# Patient Record
Sex: FEMALE | Race: AMERICAN INDIAN OR ALASKA NATIVE | NOT HISPANIC OR LATINO | URBAN - METROPOLITAN AREA
[De-identification: names, ages, dates, MRNs, and addresses within clinical notes are randomized per-mention and may not be internally consistent; named-entity substitution may affect disease eponyms.]

---

## 2019-11-01 ENCOUNTER — OUTPATIENT (OUTPATIENT)
Dept: OUTPATIENT SERVICES | Facility: HOSPITAL | Age: 74
LOS: 1 days | End: 2019-11-01
Payer: MEDICAID

## 2019-11-01 PROCEDURE — G9001: CPT

## 2019-11-03 ENCOUNTER — TRANSCRIPTION ENCOUNTER (OUTPATIENT)
Age: 74
End: 2019-11-03

## 2019-11-03 ENCOUNTER — INPATIENT (INPATIENT)
Facility: HOSPITAL | Age: 74
LOS: 4 days | Discharge: HOME | DRG: 871 | End: 2019-11-08
Attending: UROLOGY | Admitting: UROLOGY
Payer: MEDICAID

## 2019-11-03 ENCOUNTER — INPATIENT (INPATIENT)
Facility: HOSPITAL | Age: 74
LOS: 0 days | Discharge: ANOTHER HOSPITAL | DRG: 689 | End: 2019-11-03
Attending: INTERNAL MEDICINE | Admitting: INTERNAL MEDICINE
Payer: MEDICAID

## 2019-11-03 VITALS
DIASTOLIC BLOOD PRESSURE: 79 MMHG | TEMPERATURE: 99 F | OXYGEN SATURATION: 96 % | RESPIRATION RATE: 20 BRPM | SYSTOLIC BLOOD PRESSURE: 158 MMHG | HEART RATE: 122 BPM

## 2019-11-03 VITALS
HEART RATE: 123 BPM | DIASTOLIC BLOOD PRESSURE: 93 MMHG | TEMPERATURE: 98 F | RESPIRATION RATE: 18 BRPM | OXYGEN SATURATION: 97 % | SYSTOLIC BLOOD PRESSURE: 153 MMHG | WEIGHT: 139.99 LBS | HEIGHT: 61 IN

## 2019-11-03 VITALS — DIASTOLIC BLOOD PRESSURE: 50 MMHG | SYSTOLIC BLOOD PRESSURE: 80 MMHG | HEART RATE: 130 BPM

## 2019-11-03 DIAGNOSIS — Z29.9 ENCOUNTER FOR PROPHYLACTIC MEASURES, UNSPECIFIED: ICD-10-CM

## 2019-11-03 DIAGNOSIS — E11.00 TYPE 2 DIABETES MELLITUS WITH HYPEROSMOLARITY WITHOUT NONKETOTIC HYPERGLYCEMIC-HYPEROSMOLAR COMA (NKHHC): ICD-10-CM

## 2019-11-03 DIAGNOSIS — R00.0 TACHYCARDIA, UNSPECIFIED: ICD-10-CM

## 2019-11-03 DIAGNOSIS — N12 TUBULO-INTERSTITIAL NEPHRITIS, NOT SPECIFIED AS ACUTE OR CHRONIC: ICD-10-CM

## 2019-11-03 DIAGNOSIS — E78.5 HYPERLIPIDEMIA, UNSPECIFIED: ICD-10-CM

## 2019-11-03 DIAGNOSIS — R10.9 UNSPECIFIED ABDOMINAL PAIN: ICD-10-CM

## 2019-11-03 DIAGNOSIS — N17.9 ACUTE KIDNEY FAILURE, UNSPECIFIED: ICD-10-CM

## 2019-11-03 LAB
ACETONE SERPL-MCNC: ABNORMAL
ALBUMIN SERPL ELPH-MCNC: 3 G/DL — LOW (ref 3.3–5)
ALBUMIN SERPL ELPH-MCNC: 3.2 G/DL — LOW (ref 3.5–5)
ALBUMIN SERPL ELPH-MCNC: 3.7 G/DL — SIGNIFICANT CHANGE UP (ref 3.5–5)
ALP SERPL-CCNC: 108 U/L — SIGNIFICANT CHANGE UP (ref 40–120)
ALP SERPL-CCNC: 80 U/L — SIGNIFICANT CHANGE UP (ref 40–120)
ALP SERPL-CCNC: 84 U/L — SIGNIFICANT CHANGE UP (ref 40–120)
ALT FLD-CCNC: 15 U/L — SIGNIFICANT CHANGE UP (ref 10–45)
ALT FLD-CCNC: 21 U/L DA — SIGNIFICANT CHANGE UP (ref 10–60)
ALT FLD-CCNC: 25 U/L DA — SIGNIFICANT CHANGE UP (ref 10–60)
ANION GAP SERPL CALC-SCNC: 11 MMOL/L — SIGNIFICANT CHANGE UP (ref 5–17)
ANION GAP SERPL CALC-SCNC: 14 MMOL/L — SIGNIFICANT CHANGE UP (ref 5–17)
ANION GAP SERPL CALC-SCNC: 15 MMOL/L — SIGNIFICANT CHANGE UP (ref 5–17)
ANION GAP SERPL CALC-SCNC: 7 MMOL/L — SIGNIFICANT CHANGE UP (ref 5–17)
APPEARANCE UR: CLEAR — SIGNIFICANT CHANGE UP
APTT BLD: 25.4 SEC — LOW (ref 27.5–36.3)
APTT BLD: 25.8 SEC — LOW (ref 27.5–36.3)
AST SERPL-CCNC: 12 U/L — SIGNIFICANT CHANGE UP (ref 10–40)
AST SERPL-CCNC: 16 U/L — SIGNIFICANT CHANGE UP (ref 10–40)
AST SERPL-CCNC: 20 U/L — SIGNIFICANT CHANGE UP (ref 10–40)
B-OH-BUTYR SERPL-SCNC: 3.4 MMOL/L — HIGH
BASOPHILS # BLD AUTO: 0 K/UL — SIGNIFICANT CHANGE UP (ref 0–0.2)
BASOPHILS # BLD AUTO: 0.02 K/UL — SIGNIFICANT CHANGE UP (ref 0–0.2)
BASOPHILS # BLD AUTO: 0.03 K/UL — SIGNIFICANT CHANGE UP (ref 0–0.2)
BASOPHILS NFR BLD AUTO: 0 % — SIGNIFICANT CHANGE UP (ref 0–2)
BASOPHILS NFR BLD AUTO: 0.1 % — SIGNIFICANT CHANGE UP (ref 0–2)
BASOPHILS NFR BLD AUTO: 0.2 % — SIGNIFICANT CHANGE UP (ref 0–2)
BILIRUB SERPL-MCNC: 0.8 MG/DL — SIGNIFICANT CHANGE UP (ref 0.2–1.2)
BILIRUB SERPL-MCNC: 1 MG/DL — SIGNIFICANT CHANGE UP (ref 0.2–1.2)
BILIRUB SERPL-MCNC: 1.2 MG/DL — SIGNIFICANT CHANGE UP (ref 0.2–1.2)
BILIRUB UR-MCNC: NEGATIVE — SIGNIFICANT CHANGE UP
BLD GP AB SCN SERPL QL: NEGATIVE — SIGNIFICANT CHANGE UP
BLD GP AB SCN SERPL QL: SIGNIFICANT CHANGE UP
BUN SERPL-MCNC: 13 MG/DL — SIGNIFICANT CHANGE UP (ref 7–23)
BUN SERPL-MCNC: 14 MG/DL — SIGNIFICANT CHANGE UP (ref 7–18)
BUN SERPL-MCNC: 14 MG/DL — SIGNIFICANT CHANGE UP (ref 7–23)
BUN SERPL-MCNC: 18 MG/DL — SIGNIFICANT CHANGE UP (ref 7–18)
CALCIUM SERPL-MCNC: 7.8 MG/DL — LOW (ref 8.4–10.5)
CALCIUM SERPL-MCNC: 8.4 MG/DL — SIGNIFICANT CHANGE UP (ref 8.4–10.5)
CALCIUM SERPL-MCNC: 9 MG/DL — SIGNIFICANT CHANGE UP (ref 8.4–10.5)
CALCIUM SERPL-MCNC: 9.7 MG/DL — SIGNIFICANT CHANGE UP (ref 8.4–10.5)
CHLORIDE SERPL-SCNC: 106 MMOL/L — SIGNIFICANT CHANGE UP (ref 96–108)
CHLORIDE SERPL-SCNC: 107 MMOL/L — SIGNIFICANT CHANGE UP (ref 96–108)
CHLORIDE SERPL-SCNC: 108 MMOL/L — SIGNIFICANT CHANGE UP (ref 96–108)
CHLORIDE SERPL-SCNC: 98 MMOL/L — SIGNIFICANT CHANGE UP (ref 96–108)
CHOLEST SERPL-MCNC: 130 MG/DL — SIGNIFICANT CHANGE UP (ref 10–199)
CO2 SERPL-SCNC: 17 MMOL/L — LOW (ref 22–31)
CO2 SERPL-SCNC: 18 MMOL/L — LOW (ref 22–31)
CO2 SERPL-SCNC: 21 MMOL/L — LOW (ref 22–31)
CO2 SERPL-SCNC: 24 MMOL/L — SIGNIFICANT CHANGE UP (ref 22–31)
COLOR SPEC: YELLOW — SIGNIFICANT CHANGE UP
CREAT SERPL-MCNC: 1.02 MG/DL — SIGNIFICANT CHANGE UP (ref 0.5–1.3)
CREAT SERPL-MCNC: 1.12 MG/DL — SIGNIFICANT CHANGE UP (ref 0.5–1.3)
CREAT SERPL-MCNC: 1.32 MG/DL — HIGH (ref 0.5–1.3)
CREAT SERPL-MCNC: 1.43 MG/DL — HIGH (ref 0.5–1.3)
DIFF PNL FLD: ABNORMAL
E COLI DNA BLD POS QL NAA+NON-PROBE: SIGNIFICANT CHANGE UP
EOSINOPHIL # BLD AUTO: 0 K/UL — SIGNIFICANT CHANGE UP (ref 0–0.5)
EOSINOPHIL # BLD AUTO: 0 K/UL — SIGNIFICANT CHANGE UP (ref 0–0.5)
EOSINOPHIL # BLD AUTO: 0.05 K/UL — SIGNIFICANT CHANGE UP (ref 0–0.5)
EOSINOPHIL NFR BLD AUTO: 0 % — SIGNIFICANT CHANGE UP (ref 0–6)
EOSINOPHIL NFR BLD AUTO: 0 % — SIGNIFICANT CHANGE UP (ref 0–6)
EOSINOPHIL NFR BLD AUTO: 0.3 % — SIGNIFICANT CHANGE UP (ref 0–6)
FOLATE SERPL-MCNC: 12.7 NG/ML — SIGNIFICANT CHANGE UP
GAS PNL BLDA: SIGNIFICANT CHANGE UP
GAS PNL BLDA: SIGNIFICANT CHANGE UP
GAS PNL BLDV: SIGNIFICANT CHANGE UP
GLUCOSE BLDC GLUCOMTR-MCNC: 327 MG/DL — HIGH (ref 70–99)
GLUCOSE BLDC GLUCOMTR-MCNC: 328 MG/DL — HIGH (ref 70–99)
GLUCOSE BLDC GLUCOMTR-MCNC: 490 MG/DL — CRITICAL HIGH (ref 70–99)
GLUCOSE BLDC GLUCOMTR-MCNC: 493 MG/DL — CRITICAL HIGH (ref 70–99)
GLUCOSE BLDC GLUCOMTR-MCNC: 494 MG/DL — CRITICAL HIGH (ref 70–99)
GLUCOSE SERPL-MCNC: 341 MG/DL — HIGH (ref 70–99)
GLUCOSE SERPL-MCNC: 384 MG/DL — HIGH (ref 70–99)
GLUCOSE SERPL-MCNC: 525 MG/DL — CRITICAL HIGH (ref 70–99)
GLUCOSE SERPL-MCNC: 661 MG/DL — CRITICAL HIGH (ref 70–99)
GLUCOSE UR QL: 1000 MG/DL
GRAM STN FLD: SIGNIFICANT CHANGE UP
HAV IGM SER-ACNC: SIGNIFICANT CHANGE UP
HBA1C BLD-MCNC: 10.6 % — HIGH (ref 4–5.6)
HBV CORE IGM SER-ACNC: SIGNIFICANT CHANGE UP
HBV SURFACE AG SER-ACNC: SIGNIFICANT CHANGE UP
HCT VFR BLD CALC: 29.3 % — LOW (ref 34.5–45)
HCT VFR BLD CALC: 31.1 % — LOW (ref 34.5–45)
HCT VFR BLD CALC: 34.5 % — SIGNIFICANT CHANGE UP (ref 34.5–45)
HCT VFR BLD CALC: 37.2 % — SIGNIFICANT CHANGE UP (ref 34.5–45)
HCV AB S/CO SERPL IA: 0.15 S/CO — SIGNIFICANT CHANGE UP (ref 0–0.99)
HCV AB S/CO SERPL IA: 0.15 S/CO — SIGNIFICANT CHANGE UP (ref 0–0.99)
HCV AB SERPL-IMP: SIGNIFICANT CHANGE UP
HCV AB SERPL-IMP: SIGNIFICANT CHANGE UP
HDLC SERPL-MCNC: 45 MG/DL — LOW
HGB BLD-MCNC: 10.2 G/DL — LOW (ref 11.5–15.5)
HGB BLD-MCNC: 11.1 G/DL — LOW (ref 11.5–15.5)
HGB BLD-MCNC: 12.1 G/DL — SIGNIFICANT CHANGE UP (ref 11.5–15.5)
HGB BLD-MCNC: 9.7 G/DL — LOW (ref 11.5–15.5)
IMM GRANULOCYTES NFR BLD AUTO: 0.6 % — SIGNIFICANT CHANGE UP (ref 0–1.5)
IMM GRANULOCYTES NFR BLD AUTO: 0.8 % — SIGNIFICANT CHANGE UP (ref 0–1.5)
INR BLD: 1.15 RATIO — SIGNIFICANT CHANGE UP (ref 0.88–1.16)
INR BLD: 1.25 RATIO — HIGH (ref 0.88–1.16)
INR BLD: 1.3 RATIO — HIGH (ref 0.88–1.16)
KETONES UR-MCNC: ABNORMAL
LEUKOCYTE ESTERASE UR-ACNC: ABNORMAL
LIDOCAIN IGE QN: 220 U/L — SIGNIFICANT CHANGE UP (ref 73–393)
LIPID PNL WITH DIRECT LDL SERPL: 62 MG/DL — SIGNIFICANT CHANGE UP
LYMPHOCYTES # BLD AUTO: 0.69 K/UL — LOW (ref 1–3.3)
LYMPHOCYTES # BLD AUTO: 0.8 K/UL — LOW (ref 1–3.3)
LYMPHOCYTES # BLD AUTO: 1.93 K/UL — SIGNIFICANT CHANGE UP (ref 1–3.3)
LYMPHOCYTES # BLD AUTO: 14 % — SIGNIFICANT CHANGE UP (ref 13–44)
LYMPHOCYTES # BLD AUTO: 4.8 % — LOW (ref 13–44)
LYMPHOCYTES # BLD AUTO: 4.9 % — LOW (ref 13–44)
MAGNESIUM SERPL-MCNC: 1.4 MG/DL — LOW (ref 1.6–2.6)
MAGNESIUM SERPL-MCNC: 1.6 MG/DL — SIGNIFICANT CHANGE UP (ref 1.6–2.6)
MAGNESIUM SERPL-MCNC: 1.6 MG/DL — SIGNIFICANT CHANGE UP (ref 1.6–2.6)
MCHC RBC-ENTMCNC: 28.5 PG — SIGNIFICANT CHANGE UP (ref 27–34)
MCHC RBC-ENTMCNC: 28.8 PG — SIGNIFICANT CHANGE UP (ref 27–34)
MCHC RBC-ENTMCNC: 28.9 PG — SIGNIFICANT CHANGE UP (ref 27–34)
MCHC RBC-ENTMCNC: 29 PG — SIGNIFICANT CHANGE UP (ref 27–34)
MCHC RBC-ENTMCNC: 32.2 GM/DL — SIGNIFICANT CHANGE UP (ref 32–36)
MCHC RBC-ENTMCNC: 32.5 GM/DL — SIGNIFICANT CHANGE UP (ref 32–36)
MCHC RBC-ENTMCNC: 32.8 GM/DL — SIGNIFICANT CHANGE UP (ref 32–36)
MCHC RBC-ENTMCNC: 33.1 GM/DL — SIGNIFICANT CHANGE UP (ref 32–36)
MCV RBC AUTO: 87.5 FL — SIGNIFICANT CHANGE UP (ref 80–100)
MCV RBC AUTO: 88.1 FL — SIGNIFICANT CHANGE UP (ref 80–100)
MCV RBC AUTO: 88.5 FL — SIGNIFICANT CHANGE UP (ref 80–100)
MCV RBC AUTO: 88.6 FL — SIGNIFICANT CHANGE UP (ref 80–100)
METHOD TYPE: SIGNIFICANT CHANGE UP
MONOCYTES # BLD AUTO: 0.55 K/UL — SIGNIFICANT CHANGE UP (ref 0–0.9)
MONOCYTES # BLD AUTO: 0.65 K/UL — SIGNIFICANT CHANGE UP (ref 0–0.9)
MONOCYTES # BLD AUTO: 1.22 K/UL — HIGH (ref 0–0.9)
MONOCYTES NFR BLD AUTO: 4 % — SIGNIFICANT CHANGE UP (ref 2–14)
MONOCYTES NFR BLD AUTO: 4.6 % — SIGNIFICANT CHANGE UP (ref 2–14)
MONOCYTES NFR BLD AUTO: 7.2 % — SIGNIFICANT CHANGE UP (ref 2–14)
NEUTROPHILS # BLD AUTO: 11.32 K/UL — HIGH (ref 1.8–7.4)
NEUTROPHILS # BLD AUTO: 12.67 K/UL — HIGH (ref 1.8–7.4)
NEUTROPHILS # BLD AUTO: 14.63 K/UL — HIGH (ref 1.8–7.4)
NEUTROPHILS NFR BLD AUTO: 82 % — HIGH (ref 43–77)
NEUTROPHILS NFR BLD AUTO: 86.9 % — HIGH (ref 43–77)
NEUTROPHILS NFR BLD AUTO: 89.6 % — HIGH (ref 43–77)
NITRITE UR-MCNC: NEGATIVE — SIGNIFICANT CHANGE UP
NRBC # BLD: 0 /100 WBCS — SIGNIFICANT CHANGE UP (ref 0–0)
PH UR: 5 — SIGNIFICANT CHANGE UP (ref 5–8)
PHOSPHATE SERPL-MCNC: 1.9 MG/DL — LOW (ref 2.5–4.5)
PHOSPHATE SERPL-MCNC: 2.1 MG/DL — LOW (ref 2.5–4.5)
PHOSPHATE SERPL-MCNC: 2.5 MG/DL — SIGNIFICANT CHANGE UP (ref 2.5–4.5)
PLATELET # BLD AUTO: 165 K/UL — SIGNIFICANT CHANGE UP (ref 150–400)
PLATELET # BLD AUTO: 187 K/UL — SIGNIFICANT CHANGE UP (ref 150–400)
PLATELET # BLD AUTO: 224 K/UL — SIGNIFICANT CHANGE UP (ref 150–400)
PLATELET # BLD AUTO: 239 K/UL — SIGNIFICANT CHANGE UP (ref 150–400)
POTASSIUM SERPL-MCNC: 3.2 MMOL/L — LOW (ref 3.5–5.3)
POTASSIUM SERPL-MCNC: 3.5 MMOL/L — SIGNIFICANT CHANGE UP (ref 3.5–5.3)
POTASSIUM SERPL-MCNC: 3.8 MMOL/L — SIGNIFICANT CHANGE UP (ref 3.5–5.3)
POTASSIUM SERPL-MCNC: 4.5 MMOL/L — SIGNIFICANT CHANGE UP (ref 3.5–5.3)
POTASSIUM SERPL-SCNC: 3.2 MMOL/L — LOW (ref 3.5–5.3)
POTASSIUM SERPL-SCNC: 3.5 MMOL/L — SIGNIFICANT CHANGE UP (ref 3.5–5.3)
POTASSIUM SERPL-SCNC: 3.8 MMOL/L — SIGNIFICANT CHANGE UP (ref 3.5–5.3)
POTASSIUM SERPL-SCNC: 4.5 MMOL/L — SIGNIFICANT CHANGE UP (ref 3.5–5.3)
PROCALCITONIN SERPL-MCNC: 21.51 NG/ML — HIGH (ref 0.02–0.1)
PROT SERPL-MCNC: 5.9 G/DL — LOW (ref 6–8.3)
PROT SERPL-MCNC: 7.4 G/DL — SIGNIFICANT CHANGE UP (ref 6–8.3)
PROT SERPL-MCNC: 8.5 G/DL — HIGH (ref 6–8.3)
PROT UR-MCNC: 30 MG/DL
PROTHROM AB SERPL-ACNC: 12.8 SEC — SIGNIFICANT CHANGE UP (ref 10–12.9)
PROTHROM AB SERPL-ACNC: 14.3 SEC — HIGH (ref 10–12.9)
PROTHROM AB SERPL-ACNC: 14.9 SEC — HIGH (ref 10–12.9)
RBC # BLD: 3.35 M/UL — LOW (ref 3.8–5.2)
RBC # BLD: 3.53 M/UL — LOW (ref 3.8–5.2)
RBC # BLD: 3.9 M/UL — SIGNIFICANT CHANGE UP (ref 3.8–5.2)
RBC # BLD: 4.2 M/UL — SIGNIFICANT CHANGE UP (ref 3.8–5.2)
RBC # FLD: 12.8 % — SIGNIFICANT CHANGE UP (ref 10.3–14.5)
RBC # FLD: 13 % — SIGNIFICANT CHANGE UP (ref 10.3–14.5)
RBC # FLD: 13 % — SIGNIFICANT CHANGE UP (ref 10.3–14.5)
RBC # FLD: 13.2 % — SIGNIFICANT CHANGE UP (ref 10.3–14.5)
RH IG SCN BLD-IMP: POSITIVE — SIGNIFICANT CHANGE UP
SODIUM SERPL-SCNC: 133 MMOL/L — LOW (ref 135–145)
SODIUM SERPL-SCNC: 135 MMOL/L — SIGNIFICANT CHANGE UP (ref 135–145)
SODIUM SERPL-SCNC: 137 MMOL/L — SIGNIFICANT CHANGE UP (ref 135–145)
SODIUM SERPL-SCNC: 141 MMOL/L — SIGNIFICANT CHANGE UP (ref 135–145)
SP GR SPEC: 1.01 — SIGNIFICANT CHANGE UP (ref 1.01–1.02)
SPECIMEN SOURCE: SIGNIFICANT CHANGE UP
SPECIMEN SOURCE: SIGNIFICANT CHANGE UP
TOTAL CHOLESTEROL/HDL RATIO MEASUREMENT: 2.9 RATIO — LOW (ref 3.3–7.1)
TRIGL SERPL-MCNC: 114 MG/DL — SIGNIFICANT CHANGE UP (ref 10–149)
TSH SERPL-MCNC: 0.23 UU/ML — LOW (ref 0.34–4.82)
UROBILINOGEN FLD QL: NEGATIVE — SIGNIFICANT CHANGE UP
VIT B12 SERPL-MCNC: 642 PG/ML — SIGNIFICANT CHANGE UP (ref 232–1245)
WBC # BLD: 13.8 K/UL — HIGH (ref 3.8–10.5)
WBC # BLD: 14.15 K/UL — HIGH (ref 3.8–10.5)
WBC # BLD: 16.83 K/UL — HIGH (ref 3.8–10.5)
WBC # BLD: 24.46 K/UL — HIGH (ref 3.8–10.5)
WBC # FLD AUTO: 13.8 K/UL — HIGH (ref 3.8–10.5)
WBC # FLD AUTO: 14.15 K/UL — HIGH (ref 3.8–10.5)
WBC # FLD AUTO: 16.83 K/UL — HIGH (ref 3.8–10.5)
WBC # FLD AUTO: 24.46 K/UL — HIGH (ref 3.8–10.5)

## 2019-11-03 PROCEDURE — 99291 CRITICAL CARE FIRST HOUR: CPT

## 2019-11-03 PROCEDURE — 93005 ELECTROCARDIOGRAM TRACING: CPT

## 2019-11-03 PROCEDURE — 85610 PROTHROMBIN TIME: CPT

## 2019-11-03 PROCEDURE — 80061 LIPID PANEL: CPT

## 2019-11-03 PROCEDURE — 74176 CT ABD & PELVIS W/O CONTRAST: CPT | Mod: 26

## 2019-11-03 PROCEDURE — 83036 HEMOGLOBIN GLYCOSYLATED A1C: CPT

## 2019-11-03 PROCEDURE — 80053 COMPREHEN METABOLIC PANEL: CPT

## 2019-11-03 PROCEDURE — 85027 COMPLETE CBC AUTOMATED: CPT

## 2019-11-03 PROCEDURE — 82009 KETONE BODYS QUAL: CPT

## 2019-11-03 PROCEDURE — 99285 EMERGENCY DEPT VISIT HI MDM: CPT

## 2019-11-03 PROCEDURE — 82746 ASSAY OF FOLIC ACID SERUM: CPT

## 2019-11-03 PROCEDURE — 83735 ASSAY OF MAGNESIUM: CPT

## 2019-11-03 PROCEDURE — 83690 ASSAY OF LIPASE: CPT

## 2019-11-03 PROCEDURE — 80074 ACUTE HEPATITIS PANEL: CPT

## 2019-11-03 PROCEDURE — 99285 EMERGENCY DEPT VISIT HI MDM: CPT | Mod: 25

## 2019-11-03 PROCEDURE — 71045 X-RAY EXAM CHEST 1 VIEW: CPT | Mod: 26

## 2019-11-03 PROCEDURE — 87186 SC STD MICRODIL/AGAR DIL: CPT

## 2019-11-03 PROCEDURE — 96374 THER/PROPH/DIAG INJ IV PUSH: CPT

## 2019-11-03 PROCEDURE — 36415 COLL VENOUS BLD VENIPUNCTURE: CPT

## 2019-11-03 PROCEDURE — 99223 1ST HOSP IP/OBS HIGH 75: CPT

## 2019-11-03 PROCEDURE — 36620 INSERTION CATHETER ARTERY: CPT

## 2019-11-03 PROCEDURE — 71045 X-RAY EXAM CHEST 1 VIEW: CPT

## 2019-11-03 PROCEDURE — 87086 URINE CULTURE/COLONY COUNT: CPT

## 2019-11-03 PROCEDURE — 86901 BLOOD TYPING SEROLOGIC RH(D): CPT

## 2019-11-03 PROCEDURE — 74176 CT ABD & PELVIS W/O CONTRAST: CPT

## 2019-11-03 PROCEDURE — 82607 VITAMIN B-12: CPT

## 2019-11-03 PROCEDURE — 81001 URINALYSIS AUTO W/SCOPE: CPT

## 2019-11-03 PROCEDURE — 86900 BLOOD TYPING SEROLOGIC ABO: CPT

## 2019-11-03 PROCEDURE — 87150 DNA/RNA AMPLIFIED PROBE: CPT

## 2019-11-03 PROCEDURE — 84100 ASSAY OF PHOSPHORUS: CPT

## 2019-11-03 PROCEDURE — 82962 GLUCOSE BLOOD TEST: CPT

## 2019-11-03 PROCEDURE — 87040 BLOOD CULTURE FOR BACTERIA: CPT

## 2019-11-03 PROCEDURE — 86803 HEPATITIS C AB TEST: CPT

## 2019-11-03 PROCEDURE — 84443 ASSAY THYROID STIM HORMONE: CPT

## 2019-11-03 PROCEDURE — 86850 RBC ANTIBODY SCREEN: CPT

## 2019-11-03 PROCEDURE — 50433 PLMT NEPHROURETERAL CATHETER: CPT | Mod: RT

## 2019-11-03 PROCEDURE — 99233 SBSQ HOSP IP/OBS HIGH 50: CPT | Mod: 25

## 2019-11-03 RX ORDER — SODIUM CHLORIDE 9 MG/ML
1000 INJECTION, SOLUTION INTRAVENOUS
Refills: 0 | Status: DISCONTINUED | OUTPATIENT
Start: 2019-11-03 | End: 2019-11-03

## 2019-11-03 RX ORDER — ACETAMINOPHEN 500 MG
650 TABLET ORAL EVERY 8 HOURS
Refills: 0 | Status: DISCONTINUED | OUTPATIENT
Start: 2019-11-03 | End: 2019-11-08

## 2019-11-03 RX ORDER — ONDANSETRON 8 MG/1
0 TABLET, FILM COATED ORAL
Qty: 0 | Refills: 0 | DISCHARGE
Start: 2019-11-03

## 2019-11-03 RX ORDER — ONDANSETRON 8 MG/1
4 TABLET, FILM COATED ORAL THREE TIMES A DAY
Refills: 0 | Status: DISCONTINUED | OUTPATIENT
Start: 2019-11-03 | End: 2019-11-03

## 2019-11-03 RX ORDER — MORPHINE SULFATE 50 MG/1
2 CAPSULE, EXTENDED RELEASE ORAL ONCE
Refills: 0 | Status: DISCONTINUED | OUTPATIENT
Start: 2019-11-03 | End: 2019-11-03

## 2019-11-03 RX ORDER — ENOXAPARIN SODIUM 100 MG/ML
40 INJECTION SUBCUTANEOUS DAILY
Refills: 0 | Status: DISCONTINUED | OUTPATIENT
Start: 2019-11-03 | End: 2019-11-08

## 2019-11-03 RX ORDER — PIPERACILLIN AND TAZOBACTAM 4; .5 G/20ML; G/20ML
3.38 INJECTION, POWDER, LYOPHILIZED, FOR SOLUTION INTRAVENOUS ONCE
Refills: 0 | Status: COMPLETED | OUTPATIENT
Start: 2019-11-03 | End: 2019-11-03

## 2019-11-03 RX ORDER — SODIUM CHLORIDE 9 MG/ML
1000 INJECTION, SOLUTION INTRAVENOUS ONCE
Refills: 0 | Status: COMPLETED | OUTPATIENT
Start: 2019-11-03 | End: 2019-11-03

## 2019-11-03 RX ORDER — METFORMIN HYDROCHLORIDE 850 MG/1
1 TABLET ORAL
Qty: 0 | Refills: 0 | DISCHARGE

## 2019-11-03 RX ORDER — PIPERACILLIN AND TAZOBACTAM 4; .5 G/20ML; G/20ML
3.38 INJECTION, POWDER, LYOPHILIZED, FOR SOLUTION INTRAVENOUS EVERY 8 HOURS
Refills: 0 | Status: DISCONTINUED | OUTPATIENT
Start: 2019-11-03 | End: 2019-11-04

## 2019-11-03 RX ORDER — INSULIN HUMAN 100 [IU]/ML
8 INJECTION, SOLUTION SUBCUTANEOUS ONCE
Refills: 0 | Status: COMPLETED | OUTPATIENT
Start: 2019-11-03 | End: 2019-11-03

## 2019-11-03 RX ORDER — POLYETHYLENE GLYCOL 3350 17 G/17G
17 POWDER, FOR SOLUTION ORAL EVERY 24 HOURS
Refills: 0 | Status: DISCONTINUED | OUTPATIENT
Start: 2019-11-03 | End: 2019-11-08

## 2019-11-03 RX ORDER — CHLORHEXIDINE GLUCONATE 213 G/1000ML
1 SOLUTION TOPICAL
Refills: 0 | Status: DISCONTINUED | OUTPATIENT
Start: 2019-11-03 | End: 2019-11-08

## 2019-11-03 RX ORDER — SODIUM CHLORIDE 9 MG/ML
1000 INJECTION INTRAMUSCULAR; INTRAVENOUS; SUBCUTANEOUS ONCE
Refills: 0 | Status: COMPLETED | OUTPATIENT
Start: 2019-11-03 | End: 2019-11-03

## 2019-11-03 RX ORDER — INSULIN HUMAN 100 [IU]/ML
4 INJECTION, SOLUTION SUBCUTANEOUS ONCE
Refills: 0 | Status: COMPLETED | OUTPATIENT
Start: 2019-11-03 | End: 2019-11-03

## 2019-11-03 RX ORDER — NOREPINEPHRINE BITARTRATE/D5W 8 MG/250ML
0.05 PLASTIC BAG, INJECTION (ML) INTRAVENOUS
Qty: 8 | Refills: 0 | Status: DISCONTINUED | OUTPATIENT
Start: 2019-11-03 | End: 2019-11-03

## 2019-11-03 RX ORDER — INSULIN HUMAN 100 [IU]/ML
1 INJECTION, SOLUTION SUBCUTANEOUS
Qty: 100 | Refills: 0 | Status: DISCONTINUED | OUTPATIENT
Start: 2019-11-03 | End: 2019-11-03

## 2019-11-03 RX ORDER — MAGNESIUM SULFATE 500 MG/ML
2 VIAL (ML) INJECTION
Refills: 0 | Status: COMPLETED | OUTPATIENT
Start: 2019-11-03 | End: 2019-11-03

## 2019-11-03 RX ORDER — INSULIN HUMAN 100 [IU]/ML
4 INJECTION, SOLUTION SUBCUTANEOUS
Qty: 100 | Refills: 0 | Status: DISCONTINUED | OUTPATIENT
Start: 2019-11-03 | End: 2019-11-04

## 2019-11-03 RX ORDER — INSULIN GLARGINE 100 [IU]/ML
15 INJECTION, SOLUTION SUBCUTANEOUS AT BEDTIME
Refills: 0 | Status: DISCONTINUED | OUTPATIENT
Start: 2019-11-03 | End: 2019-11-03

## 2019-11-03 RX ORDER — ENOXAPARIN SODIUM 100 MG/ML
40 INJECTION SUBCUTANEOUS DAILY
Refills: 0 | Status: DISCONTINUED | OUTPATIENT
Start: 2019-11-03 | End: 2019-11-03

## 2019-11-03 RX ORDER — CHLORHEXIDINE GLUCONATE 213 G/1000ML
1 SOLUTION TOPICAL DAILY
Refills: 0 | Status: DISCONTINUED | OUTPATIENT
Start: 2019-11-03 | End: 2019-11-03

## 2019-11-03 RX ORDER — PHENYLEPHRINE HYDROCHLORIDE 10 MG/ML
0.5 INJECTION INTRAVENOUS
Qty: 40 | Refills: 0 | Status: DISCONTINUED | OUTPATIENT
Start: 2019-11-03 | End: 2019-11-04

## 2019-11-03 RX ORDER — ATORVASTATIN CALCIUM 80 MG/1
10 TABLET, FILM COATED ORAL AT BEDTIME
Refills: 0 | Status: DISCONTINUED | OUTPATIENT
Start: 2019-11-03 | End: 2019-11-03

## 2019-11-03 RX ORDER — POTASSIUM CHLORIDE 20 MEQ
10 PACKET (EA) ORAL
Refills: 0 | Status: COMPLETED | OUTPATIENT
Start: 2019-11-03 | End: 2019-11-03

## 2019-11-03 RX ORDER — ACETAMINOPHEN 500 MG
650 TABLET ORAL ONCE
Refills: 0 | Status: DISCONTINUED | OUTPATIENT
Start: 2019-11-03 | End: 2019-11-03

## 2019-11-03 RX ORDER — SODIUM CHLORIDE 9 MG/ML
1000 INJECTION, SOLUTION INTRAVENOUS
Refills: 0 | Status: DISCONTINUED | OUTPATIENT
Start: 2019-11-03 | End: 2019-11-04

## 2019-11-03 RX ORDER — ATORVASTATIN CALCIUM 80 MG/1
10 TABLET, FILM COATED ORAL AT BEDTIME
Refills: 0 | Status: DISCONTINUED | OUTPATIENT
Start: 2019-11-03 | End: 2019-11-04

## 2019-11-03 RX ORDER — SENNA PLUS 8.6 MG/1
2 TABLET ORAL AT BEDTIME
Refills: 0 | Status: DISCONTINUED | OUTPATIENT
Start: 2019-11-03 | End: 2019-11-08

## 2019-11-03 RX ORDER — SODIUM CHLORIDE 9 MG/ML
0 INJECTION, SOLUTION INTRAVENOUS
Qty: 0 | Refills: 0 | DISCHARGE
Start: 2019-11-03

## 2019-11-03 RX ORDER — INSULIN LISPRO 100/ML
VIAL (ML) SUBCUTANEOUS
Refills: 0 | Status: DISCONTINUED | OUTPATIENT
Start: 2019-11-03 | End: 2019-11-03

## 2019-11-03 RX ADMIN — SODIUM CHLORIDE 1000 MILLILITER(S): 9 INJECTION INTRAMUSCULAR; INTRAVENOUS; SUBCUTANEOUS at 03:07

## 2019-11-03 RX ADMIN — INSULIN HUMAN 4 UNIT(S)/HR: 100 INJECTION, SOLUTION SUBCUTANEOUS at 22:24

## 2019-11-03 RX ADMIN — Medication 100 MILLIEQUIVALENT(S): at 18:40

## 2019-11-03 RX ADMIN — Medication 6: at 11:32

## 2019-11-03 RX ADMIN — INSULIN HUMAN 4 UNIT(S): 100 INJECTION, SOLUTION SUBCUTANEOUS at 22:24

## 2019-11-03 RX ADMIN — SODIUM CHLORIDE 125 MILLILITER(S): 9 INJECTION, SOLUTION INTRAVENOUS at 18:41

## 2019-11-03 RX ADMIN — SODIUM CHLORIDE 2000 MILLILITER(S): 9 INJECTION, SOLUTION INTRAVENOUS at 14:35

## 2019-11-03 RX ADMIN — PHENYLEPHRINE HYDROCHLORIDE 12.11 MICROGRAM(S)/KG/MIN: 10 INJECTION INTRAVENOUS at 19:39

## 2019-11-03 RX ADMIN — Medication 50 GRAM(S): at 23:23

## 2019-11-03 RX ADMIN — SODIUM CHLORIDE 4000 MILLILITER(S): 9 INJECTION, SOLUTION INTRAVENOUS at 18:17

## 2019-11-03 RX ADMIN — Medication 100 MILLIEQUIVALENT(S): at 19:39

## 2019-11-03 RX ADMIN — Medication 6: at 07:52

## 2019-11-03 RX ADMIN — Medication 50 GRAM(S): at 22:36

## 2019-11-03 RX ADMIN — ATORVASTATIN CALCIUM 10 MILLIGRAM(S): 80 TABLET, FILM COATED ORAL at 21:23

## 2019-11-03 RX ADMIN — SODIUM CHLORIDE 1000 MILLILITER(S): 9 INJECTION INTRAMUSCULAR; INTRAVENOUS; SUBCUTANEOUS at 02:31

## 2019-11-03 RX ADMIN — PHENYLEPHRINE HYDROCHLORIDE 12.11 MICROGRAM(S)/KG/MIN: 10 INJECTION INTRAVENOUS at 18:40

## 2019-11-03 RX ADMIN — INSULIN HUMAN 8 UNIT(S): 100 INJECTION, SOLUTION SUBCUTANEOUS at 02:31

## 2019-11-03 RX ADMIN — PIPERACILLIN AND TAZOBACTAM 200 GRAM(S): 4; .5 INJECTION, POWDER, LYOPHILIZED, FOR SOLUTION INTRAVENOUS at 10:57

## 2019-11-03 RX ADMIN — SODIUM CHLORIDE 125 MILLILITER(S): 9 INJECTION, SOLUTION INTRAVENOUS at 19:40

## 2019-11-03 RX ADMIN — Medication 6.56 MICROGRAM(S)/KG/MIN: at 15:16

## 2019-11-03 RX ADMIN — Medication 250 MILLIMOLE(S): at 22:38

## 2019-11-03 RX ADMIN — MORPHINE SULFATE 2 MILLIGRAM(S): 50 CAPSULE, EXTENDED RELEASE ORAL at 05:00

## 2019-11-03 RX ADMIN — PIPERACILLIN AND TAZOBACTAM 200 GRAM(S): 4; .5 INJECTION, POWDER, LYOPHILIZED, FOR SOLUTION INTRAVENOUS at 18:42

## 2019-11-03 RX ADMIN — SODIUM CHLORIDE 125 MILLILITER(S): 9 INJECTION, SOLUTION INTRAVENOUS at 10:59

## 2019-11-03 RX ADMIN — Medication 100 MILLIEQUIVALENT(S): at 20:59

## 2019-11-03 RX ADMIN — SENNA PLUS 2 TABLET(S): 8.6 TABLET ORAL at 21:23

## 2019-11-03 RX ADMIN — MORPHINE SULFATE 2 MILLIGRAM(S): 50 CAPSULE, EXTENDED RELEASE ORAL at 04:31

## 2019-11-03 RX ADMIN — SODIUM CHLORIDE 70 MILLILITER(S): 9 INJECTION, SOLUTION INTRAVENOUS at 05:10

## 2019-11-03 NOTE — ED ADULT NURSE REASSESSMENT NOTE - NS ED NURSE REASSESS COMMENT FT1
Pt now c/o weakness, BP 74/53, MD Aware and ay bedside assessing pt, administering IV fluids as per MD orders. Pt AAOx3, mentating but drowsy. Daughter at bedside.

## 2019-11-03 NOTE — DISCHARGE NOTE PROVIDER - NSDCMRMEDTOKEN_GEN_ALL_CORE_FT
atorvastatin 10 mg oral tablet: 1 tab(s) orally once a day  Lactated Ringers Injection intravenous solution:  intravenous   ondansetron 2 mg/mL injectable solution:  injectable

## 2019-11-03 NOTE — H&P ADULT - ASSESSMENT
74 year old female with uncontrolled DM2, HTN, HLD presented to  ED with sepsis.  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  Hypotensive and tachycardic.  Needs decompression of R kidney to decrease risk of progression to emphysematous pyelonephritis.    Prefer IR NT to be placed into R kidney given the risks with general anesthesia with severe sepsis currently.  Discussed w/ IR fellow.    -- Admit to urology  -- IV abx  -- Continue almonte for now  -- f/u urine and blood cultures  -- f/u IR regarding placement of R NT, NT Cx  -- SICU consult  -- d/w Dr. Mcdonnell

## 2019-11-03 NOTE — PROGRESS NOTE ADULT - SUBJECTIVE AND OBJECTIVE BOX
Vascular & Interventional Radiology Post-Procedure Note    Pre-Procedure Diagnosis: Right emphysematous pyelitis  Post-Procedure Diagnosis: Same as pre.  Indications for Procedure: Sepsis    : Galen CLIFFORD, Juanjose CLIFFORD    Procedure Details/Findings: IVP (Intravenous Pyelogram) and placement of R Percutaneous Nephrostomy Tube. No hydronephrosis. Mild right hydroureter. No ureteral obstruction is noted.     Complications: None  Estimated Blood Loss: Minimal  Specimen: Sent for Microbiology  Contrast: 70ml  Sedation: MAC  Patient Condition/Disposition: Maintaining BP with 0.15mcg/kg of Levophed. SICU    Plan:     Sepsis and hyperglycemia management per SICU.

## 2019-11-03 NOTE — H&P ADULT - ASSESSMENT
74 Female with PMH of Diabetes, HLD came to hospital for uncontrolled blood sugar levels. Admitted to medicine for Hyperglycemic Hyperosmolar Nonketotic Coma.

## 2019-11-03 NOTE — ED ADULT NURSE NOTE - NSIMPLEMENTINTERV_GEN_ALL_ED
Implemented All Fall with Harm Risk Interventions:  [place] to call system. Call bell, personal items and telephone within reach. Instruct patient to call for assistance. Room bathroom lighting operational. Non-slip footwear when patient is off stretcher. Physically safe environment: no spills, clutter or unnecessary equipment. Stretcher in lowest position, wheels locked, appropriate side rails in place. Provide visual cue, wrist band, yellow gown, etc. Monitor gait and stability. Monitor for mental status changes and reorient to person, place, and time. Review medications for side effects contributing to fall risk. Reinforce activity limits and safety measures with patient and family. Provide visual clues: red socks.

## 2019-11-03 NOTE — PROGRESS NOTE ADULT - SUBJECTIVE AND OBJECTIVE BOX
Post Procedure Check    Pt seen and examined without complaints.    Vital Signs Last 24 Hrs  T(C): 37.3 (03 Nov 2019 19:00), Max: 38.2 (03 Nov 2019 13:37)  T(F): 99.1 (03 Nov 2019 19:00), Max: 100.7 (03 Nov 2019 13:37)  HR: 94 (03 Nov 2019 22:15) (93 - 130)  BP: 92/55 (03 Nov 2019 19:00) (67/54 - 158/79)  BP(mean): 68 (03 Nov 2019 19:00) (68 - 80)  RR: 16 (03 Nov 2019 22:15) (16 - 29)  SpO2: 95% (03 Nov 2019 22:15) (94% - 99%)    I&O's Summary    03 Nov 2019 07:01  -  03 Nov 2019 23:00  --------------------------------------------------------  IN: 1963.7 mL / OUT: 315 mL / NET: 1648.7 mL        Physical Exam  Gen: NAD  Abd: Soft, NT, ND  Back: R PCN in place, draining pink to clear urine  : almnote inh place, urine clear yellow                          10.2   24.46 )-----------( 239      ( 03 Nov 2019 18:42 )             31.1       11-03    135  |  107  |  13  ----------------------------<  384<H>  4.5   |  17<L>  |  1.02    Ca    7.8<L>      03 Nov 2019 21:31  Phos  1.9     11-03  Mg     1.4     11-03    TPro  5.9<L>  /  Alb  3.0<L>  /  TBili  1.0  /  DBili  x   /  AST  20  /  ALT  15  /  AlkPhos  80  11-03      A/P: 74y Female s/p R PCN placement by Interventional Radiology    -cont abx  -f/u cultures  -complete plan as per SICU

## 2019-11-03 NOTE — ED PROVIDER NOTE - ATTENDING CONTRIBUTION TO CARE
74F, pmh DM II, HLD, presents as transfer from OSH for emphysematous pyelitis. Pt being transferred to floor, had been given abx, 1L IVF, while en route developed hypotension and tachycardia. +N/V. +R flank pain, mild, rad to abd, no clear provoking or alleviating factors. Denies cp, sob, abd pain. Very hyperglycemic at OSH, given insulin.     PE: NAD, NCAT, MMM, Trachea midline, Normal conjunctiva, lungs CTAB, S1/S2 tachycardic, Normal perfusion, 2+ radial pulses bilat, Abdomen Soft, NTND, No rebound/guarding, No LE edema, No deformity of extremities, No rashes,  No focal motor or sensory deficits.     Pt hypotensive on arrival. Already received 1L IVF, continuing IVF resuscitation, if does not respond adequately will begin pressors. D/w IR, who asks that urology be consulted for evaluation and possible surgical intervention. Check labs. Cultures sent at OSH. Monitor closely. - Nikhil Olson MD

## 2019-11-03 NOTE — CHART NOTE - NSCHARTNOTEFT_GEN_A_CORE
Consult requested as per attending Dr Scott,   urology Dr Camargo and surgical team Dr Gray recommends to transfer patient for IR nephrostomy tube placement   Infectious dx Dr Cuenca consulted will f/u     Transfer center notified (spoke Nino) and transfer requested     Patient accepted for transfer by Dr Sow IR and Dr Andersen hospitalist to [facility] for further care   Patient and her daughter Rosa Maria Osman agrees to transfer     pending EMS transportation for transfer

## 2019-11-03 NOTE — H&P ADULT - HISTORY OF PRESENT ILLNESS
74 Female with PMH of Diabetes, HLD presented to  ED for sepsis 2/2 uncontrolled blood sugar levels. Pt has been taking metformin for her diabetes and last saw her PMD 3 months ago. She returned from [place] a month ago and has not been follow up with any doctor due to lack of insurance. She has been taking metformin only that she got from [place]. Today she started having nausea, vomiting and when checked her sugar it was very high. Denies fever, chest pain, shortness of breath, problems with urine or bowel. She has also felt right flank pain noted today which is mild, non-radiating, dull without aggravating factors. No trauma or heavy lifting.     Transferred to  for R NT.  Febrile, tachycardic and hypotensive here

## 2019-11-03 NOTE — H&P ADULT - ATTENDING COMMENTS
patient was seen and examined in ICU, after right NT placement. Discussed plan as above with patient and her family in detail. Diabetic control discussed. Appreciate ICU care and resuscitation. Keep Garcia and NT to drainage.

## 2019-11-03 NOTE — PROGRESS NOTE ADULT - SUBJECTIVE AND OBJECTIVE BOX
Vascular & Interventional Radiology Pre-Procedure Note    Procedure Name: Right Percutaneous Nephrostomy Tube Placement    HPI: 74y Female with emphysematous pyelonephritis presenting with sepsis.     Allergies: Unknown    Medications: See EMR      Data:  154.94  70, 63.5  T(C): 38.2  HR: 114  BP: 76/53  RR: 23  SpO2: 95%    -WBC 16.83 / HgB 11.1 / Hct 34.5 / Plt 187  -Na 137 / Cl 106 / BUN 14 / Glucose 525  -K 3.8 / CO2 24 / Cr 1.32  -ALT 21 / Alk Phos 84 / T.Bili 0.8  -INR1.15    Imaging: CT A/P 11/03/19    Plan:   -74y Female presents for right sided percutaneous nephrostomy placement.   -Risks/Benefits/alternatives explained with the patient and/or healthcare proxy and witnessed informed consent obtained.

## 2019-11-03 NOTE — ED PROVIDER NOTE - PROGRESS NOTE DETAILS
Resident: Johnnie Alvarado - Urology came to see pt, said they have discussed pt directly with IR and IR will likely be taking pt for nephrostomy. Awaiting final word. Pt still stable however map slowly trending down, will begin another 1L bolus, did POCUS that showed clear lungs. pt remained hypotensive, tachycardic s/p IVF resuscitation. given this, norepinephrine gtt started. pt to IR shortly. - Nikhil Olson MD

## 2019-11-03 NOTE — H&P ADULT - PROBLEM SELECTOR PLAN 2
Rt flank pain with tenderness  No radiation or trauma  No problems with urine or bowel.  Ordered Ct abdomen with IV contrast

## 2019-11-03 NOTE — ED PROVIDER NOTE - CLINICAL SUMMARY MEDICAL DECISION MAKING FREE TEXT BOX
74 Y F tx here for emphysematous pyelonephritis has already had workup at OSH, will continue fluid bolus, pt mentating fine with MAP >65, febrile will give Tylenol. Spoke with IR and Urology who will be down to see pt as well. do not feel that pt needs pressors at the moment will monitor closely.

## 2019-11-03 NOTE — H&P ADULT - NSHPPHYSICALEXAM_GEN_ALL_CORE
Vital Signs Last 24 Hrs  T(C): 36.9 (03 Nov 2019 01:49), Max: 36.9 (03 Nov 2019 01:49)  T(F): 98.5 (03 Nov 2019 01:49), Max: 98.5 (03 Nov 2019 01:49)  HR: 123 (03 Nov 2019 01:49) (123 - 123)  BP: 153/93 (03 Nov 2019 01:49) (153/93 - 153/93)  RR: 18 (03 Nov 2019 01:49) (18 - 18)  SpO2: 97% (03 Nov 2019 01:49) (97% - 97%)  .  GENERAL: Well developed, Elderly female, mild pain  HEENT:  Normocephalic/Atraumatic, reactive light reflex, moist mucous membranes  NECK: Supple, no JVD  RESP: Symmetric movement of the chest, clear to auscultation bilaterally  CVS: + tachy, S1 and S2 audible, no murmur, rubs or gallops noted  GI: Normal active bowel sounds present, abdomen soft, non tender, Rt flank pain   EXTREMITIES:  No edema, no clubbing, cyanosis  MSK: 5/5 strength bilateral upper and lower extremities, intact sensations to light & crude touch  PSYCH: Normal mood, normal affect observed    NEURO: Alert and oriented x 3

## 2019-11-03 NOTE — H&P ADULT - NSHPLABSRESULTS_GEN_ALL_CORE
LABS:   @ 10:42  WBC 16.83 / Hct 34.5  / SCr 1.32      @ 02:54  WBC 13.80 / Hct 37.2  / SCr 1.43         137  |  106  |  14  ----------------------------<  525<HH>  3.8   |  24  |  1.32<H>    Ca    9.0      2019 10:42  Phos  2.1       Mg     1.6         TPro  7.4  /  Alb  3.2<L>  /  TBili  0.8  /  DBili  x   /  AST  12  /  ALT  21  /  AlkPhos  84      PT/INR - ( 2019 10:24 )   PT: 12.8 sec;   INR: 1.15 ratio     RADIOLOGY:    < from: CT Abdomen and Pelvis No Cont (19 @ 08:27) >  FINDINGS:    LOWER CHEST: Mild bibasilar dependent changes. Coronary artery   calcifications. Mild cardiomegaly.    LIVER: Enlarged caudate lobe. Lobular contour. Consider cirrhosis.  BILE DUCTS: Normal caliber.  GALLBLADDER: Within normal limits.  SPLEEN: Mildlyenlarged measuring 13.9 cm sagittal. Splenic contour.  PANCREAS: Within normal limits.  ADRENALS: Within normal limits.  KIDNEYS/URETERS: There is moderate right hydroureteronephrosis down to   level urinary bladder with moderate right perinephric and periureteral   inflammatory stranding. No calculus is identified. There is air in the   right mid and upper pole calyces as well as within the right ureter.    BLADDER: Air is present in the urinary bladder.  REPRODUCTIVE ORGANS: Unremarkable uterus.    BOWEL: No bowel obstruction. Appendix is normal.  PERITONEUM: No ascites.  VESSELS: Within normal limits.  RETROPERITONEUM/LYMPH NODES: No lymphadenopathy.    ABDOMINAL WALL: 3.3 cm fat-containing umbilical hernia.  BONES: Mild thoracic degenerative changes. Grade 1 L4-5 anterolisthesis.    IMPRESSION:     Moderate right hydroureteronephrosis with perinephric and periureteral   fat stranding. Air within the right renal collecting system, right ureter   and urinary bladder without history of catheterization. 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    Surgical, urology and nephrology evaluation advised.    Enlarged hepatic caudate lobe and lobular hepatic contour suggestive of   cirrhosis.    Mild splenomegaly.    CRITICAL VALUE: I discussed the major findings in this report with TR Madden in ED Holding 11/3/2019 8:52 AM by Dr. Ramírez with readback.   Critical value policy of the hospital was followed. Read back and   confirmation of receipt of this communication was  performed. Thisverbal communication supplements the text report of this   document.    < end of copied text >         Urinalysis Basic - ( 2019 02:54 )    Color: Yellow / Appearance: Clear / S.010 / pH: x  Gluc: x / Ketone: Moderate  / Bili: Negative / Urobili: Negative   Blood: x / Protein: 30 mg/dL / Nitrite: Negative   Leuk Esterase: Small / RBC: >50 /HPF / WBC 3-5 /HPF   Sq Epi: x / Non Sq Epi: Occasional /HPF / Bacteria: Negative /HPF    Urine Cx: p  Blood Cx: p

## 2019-11-03 NOTE — H&P ADULT - NSHPPHYSICALEXAM_GEN_ALL_CORE
Physical Exam  Vital signs  T(C): 38.2 (19 @ 13:37), Max: 38.2 (19 @ 13:37)  HR: 124 (19 @ 13:50)  BP: 90/59 (19 @ 13:50)  SpO2: 98% (19 @ 13:50)  Wt(kg): --    Output    Physical Exam  Gen:  NAD    Pulm:  No respiratory distress  	  CV:  Sinus Tachycardia    GI:  Soft, NT, ND.  Lower midline scar well healed (from prior )    :  No suprapubic tenderness  No CVAT b/l  Garcia secured draining clear yellow urine

## 2019-11-03 NOTE — CONSULT NOTE ADULT - ASSESSMENT
73 y/o woman with poorly-controlled DM, HTN, HLD with findings concerning for emphysematous pyelonephritis    Pt critically ill    -NPO  -IV hydration  -IV abx, ID consult  -U Cx  -BS control  -Possible right nephrectomy 75 y/o woman with poorly-controlled DM, HTN, HLD with findings concerning for emphysematous pyelonephritis    Pt critically ill    -NPO  -IV hydration  -FC  -IV abx, ID consult  -U Cx  -BS control  -Need right nephrostomy tube by IR  -D/W Dr Camargo

## 2019-11-03 NOTE — PROCEDURE NOTE - NSPROCDETAILS_GEN_ALL_CORE
positive blood return obtained via catheter/sutured in place/all materials/supplies accounted for at end of procedure/connected to a pressurized flush line/hemostasis with direct pressure, dressing applied/Seldinger technique/ultrasound guidance/location identified, draped/prepped, sterile technique used, needle inserted/introduced

## 2019-11-03 NOTE — CHART NOTE - NSCHARTNOTEFT_GEN_A_CORE
Patient is a 74y old  Female who presents with a chief complaint of Hyperglycemia (03 Nov 2019 03:56)    Received critical read of abdominal CT from radiologist Dr Henriquez  free air found in bladder and right kidney     Per patient and daughter at bedside no manipulation/almonte and no trauma   Patient sitting comfortably, endorses some right flank area pain   on evaluation abdomen soft, non-tender and no CVAT       Vital Signs Last 24 Hrs  T(F): 98.4 (03 Nov 2019 07:32), Max: 98.5 (03 Nov 2019 01:49)  HR: 116 (03 Nov 2019 07:32) (116 - 123)  BP: 120/69 (03 Nov 2019 07:32) (120/69 - 153/93)  RR: 17 (03 Nov 2019 07:32) (17 - 18)  SpO2: 97% (03 Nov 2019 07:32) (97% - 97%)      Care Collaborated Discussed with Dr Gray   Pending full evaluation from surgical and urology team Patient is a 74y old  Female who presents with a chief complaint of Hyperglycemia (03 Nov 2019 03:56)    Received critical read of abdominal CT from radiologist Dr Henriquez:  "Moderate right hydroureteronephrosis with perinephric and periureteral fat   stranding. Air within the right renal collecting system, right ureter and   urinary bladder without history of catheterization. Concerning for   emphysematous pyelitis"     Per patient and daughter at bedside no manipulation/almonte and no trauma   Patient sitting comfortably, endorses some right flank area pain   on evaluation abdomen soft, non-tender and no CVAT       Vital Signs Last 24 Hrs  T(F): 98.4 (03 Nov 2019 07:32), Max: 98.5 (03 Nov 2019 01:49)  HR: 116 (03 Nov 2019 07:32) (116 - 123)  BP: 120/69 (03 Nov 2019 07:32) (120/69 - 153/93)  RR: 17 (03 Nov 2019 07:32) (17 - 18)  SpO2: 97% (03 Nov 2019 07:32) (97% - 97%)      Care Collaborated Discussed with attending Dr Scott and Dr Gray in house surgical and urology officer   will keep NPO till full evaluation from surgical and urology team

## 2019-11-03 NOTE — DISCHARGE NOTE PROVIDER - NSDCFUADDINST_GEN_ALL_CORE_FT
You are transferred emergently to [facility] for further treatment and management of your conditions

## 2019-11-03 NOTE — DISCHARGE NOTE PROVIDER - CARE PROVIDER_API CALL
Jaya Sow)  Interventional Radiology and Diagnostic Radiology  [address] [place] [address]  Phone: [phone]  Fax: [phone]  Follow Up Time:     Cuco Andersen)  Internal Medicine  [address] [place] [address]  Phone: [phone]  Fax: [phone]  Follow Up Time:

## 2019-11-03 NOTE — ED PROVIDER NOTE - OBJECTIVE STATEMENT
73 y/o F pt with a PMHx of DM and HLD and no significant PSHx presents to ED c/o nausea, vomiting, and hyperglycemia today. Pt is visiting here from [place] and has been here for a month and only takes Metformin for her DM, per daughter at bedside. Daughter states that today upon checking her sugar it was high. In ED, pt tachycardic to 123, w/an elevated finger stick (machine unable to give a number). Pt denies any other acute complaints. NKDA.

## 2019-11-03 NOTE — CONSULT NOTE ADULT - SUBJECTIVE AND OBJECTIVE BOX
SICU CONSULT NOTE    HPI  74 Female with PMH of Diabetes, HLD presented to  ED for sepsis 2/2 uncontrolled blood sugar levels. Pt has been taking metformin for her diabetes and last saw her PMD 3 months ago. She returned from [place] a month ago and has not been follow up with any doctor due to lack of insurance. She has been taking metformin only that she got from [place]. Today she started having nausea, vomiting and when checked her sugar it was very high. Denies fever, chest pain, shortness of breath, problems with urine or bowel. She has also felt right flank pain noted today which is mild, non-radiating, dull without aggravating factors. No trauma or heavy lifting.   At [facility] CT scan obtained showing right sided emphysematous pyelonephritis and FSG in 660s. Patient was transferred to [facility] for further management. En route patient became tachycardic and hypotensive. In ED patient was given 3L LR and started on levo at .05 for persistent hypotension. Patient taken to IR for right nephrostomy tube placement, placed under local. On arrival to SICU patient on .15 of levo. Left radial A-line placed at bedside, switched to william. Bedside ultrasound performed showing hyperdynamic LV function, appearing unfilled. 1LR bolus given.     PAST MEDICAL HISTORY: HLD (hyperlipidemia)  DM (diabetes mellitus)      PAST SURGICAL HISTORY: No significant past surgical history    SOCIAL HISTORY: denies     CODE STATUS: full code     HOME MEDICATIONS:     ALLERGIES: Allergy Status Unknown      VITAL SIGNS:  ICU Vital Signs Last 24 Hrs  T(C): 36.9 (03 Nov 2019 17:12), Max: 38.2 (03 Nov 2019 13:37)  T(F): 98.4 (03 Nov 2019 17:12), Max: 100.7 (03 Nov 2019 13:37)  HR: 112 (03 Nov 2019 18:30) (109 - 130)  BP: 104/54 (03 Nov 2019 18:15) (67/54 - 158/79)  BP(mean): 78 (03 Nov 2019 18:15) (75 - 80)  ABP: 148/138 (03 Nov 2019 18:30) (97/61 - 148/138)  ABP(mean): 144 (03 Nov 2019 18:30) (76 - 144)  RR: 27 (03 Nov 2019 18:30) (17 - 27)  SpO2: 94% (03 Nov 2019 18:30) (94% - 98%)      NEURO  Exam: AAOX3      RESPIRATORY  Mechanical Ventilation:   ABG - ( 03 Nov 2019 18:32 )  pH: 7.37  /  pCO2: 29    /  pO2: 87    / HCO3: 16    / Base Excess: -7.4  /  SaO2: 97      Lactate: x                Exam: CTABL       CARDIOVASCULAR  VBG - ( 03 Nov 2019 15:30 )  pH: 7.37  /  pCO2: 37    /  pO2: 24    / HCO3: 21    / Base Excess: -3.2  /  SaO2: 42     Lactate: 4.8              Exam:  sinus tachycardia   phenylephrine    Infusion 0.5 MICROgram(s)/kG/Min IV Continuous <Continuous>      GI/NUTRITION  Exam: soft, ND, mild TTP at right flank  Diet: NPO       GENITOURINARY/RENAL  lactated ringers. 1000 milliLiter(s) IV Continuous <Continuous>  potassium chloride  10 mEq/100 mL IVPB 10 milliEquivalent(s) IV Intermittent every 1 hour      11-03 @ 07:01  -  11-03 @ 18:44  --------------------------------------------------------  IN:  Total IN: 0 mL    OUT:    Indwelling Catheter - Urethral: 100 mL  Total OUT: 100 mL    Total NET: -100 mL        Weight (kg): 64.6 (11-03 @ 17:12), 63.5 (11-03 @ 01:49)  11-03    141  |  108  |  14  ----------------------------<  341<H>  3.2<L>   |  18<L>  |  1.12    Ca    8.4      03 Nov 2019 15:20  Phos  2.1     11-03  Mg     1.6     11-03    TPro  5.9<L>  /  Alb  3.0<L>  /  TBili  1.0  /  DBili  x   /  AST  20  /  ALT  15  /  AlkPhos  80  11-03    [x ] Garcia catheter, indication: urine output monitoring in critically ill patient    HEMATOLOGIC  [ x] VTE Prophylaxis:  enoxaparin Injectable 40 milliGRAM(s) SubCutaneous daily                          9.7    14.15 )-----------( 165      ( 03 Nov 2019 15:20 )             29.3     PT/INR - ( 03 Nov 2019 15:20 )   PT: 14.3 sec;   INR: 1.25 ratio         PTT - ( 03 Nov 2019 15:20 )  PTT:25.8 sec  Transfusion: [ ] PRBC	[ ] Platelets	[ ] FFP	[ ] Cryoprecipitate      INFECTIOUS DISEASES  piperacillin/tazobactam IVPB.. 3.375 Gram(s) IV Intermittent every 8 hours    RECENT CULTURES:      ENDOCRINE  insulin regular Infusion 1 Unit(s)/Hr IV Continuous <Continuous>    CAPILLARY BLOOD GLUCOSE      POCT Blood Glucose.: 490 mg/dL (03 Nov 2019 11:24)  POCT Blood Glucose.: 493 mg/dL (03 Nov 2019 08:29)  POCT Blood Glucose.: 494 mg/dL (03 Nov 2019 07:44)  POCT Blood Glucose.: 475 mg/dL (03 Nov 2019 03:59)  POCT Blood Glucose.: >600 mg/dL (03 Nov 2019 02:23)      PATIENT CARE ACCESS DEVICES:  [ x] Peripheral IV  [ ] Central Venous Line	[ ] R	[ ] L	[ ] IJ	[ ] Fem	[ ] SC	Placed:   [x ] Arterial Line		[ ] R	[x ] L	[ ] Fem	[ x] Rad	[ ] Ax	Placed:   [ ] PICC:					[ ] Mediport  [ ] Urinary Catheter, Date Placed:   [x] Necessity of urinary, arterial, and venous catheters discussed    OTHER MEDICATIONS: chlorhexidine 2% Cloths 1 Application(s) Topical daily      IMAGING STUDIES:  < from: CT Abdomen and Pelvis No Cont (11.03.19 @ 08:27) >  COMPARISON: None.    PROCEDURE:   CT of the Abdomen and Pelvis was performed without intravenous contrast.   Intravenous contrast: None.  Oral contrast: None.  Sagittal and coronal reformats were performed.    LIMITATIONS: Evaluation of the solid organs and GI tract is limited   without oral and IV contrast.    FINDINGS:    LOWER CHEST: Mild bibasilar dependent changes. Coronary artery   calcifications. Mild cardiomegaly.    LIVER: Enlarged caudate lobe. Lobular contour. Consider cirrhosis.  BILE DUCTS: Normal caliber.  GALLBLADDER: Within normal limits.  SPLEEN: Mildlyenlarged measuring 13.9 cm sagittal. Splenic contour.  PANCREAS: Within normal limits.  ADRENALS: Within normal limits.  KIDNEYS/URETERS: There is moderate right hydroureteronephrosis down to   level urinary bladder with moderate right perinephric and periureteral   inflammatory stranding. No calculus is identified. There is air in the   right mid and upper pole calyces as well as within the right ureter.    BLADDER: Air is present in the urinary bladder.  REPRODUCTIVE ORGANS: Unremarkable uterus.    BOWEL: No bowel obstruction. Appendix is normal.  PERITONEUM: No ascites.  VESSELS: Within normal limits.  RETROPERITONEUM/LYMPH NODES: No lymphadenopathy.    ABDOMINAL WALL: 3.3 cm fat-containing umbilical hernia.  BONES: Mild thoracic degenerative changes. Grade 1 L4-5 anterolisthesis.    IMPRESSION:     Moderate right hydroureteronephrosis with perinephric and periureteral   fat stranding. Air within the right renal collecting system, right ureter   and urinary bladder without history of catheterization. 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

## 2019-11-03 NOTE — DISCHARGE NOTE PROVIDER - HOSPITAL COURSE
Patient is a 74y old  Female who presents with a chief complaint of Hyperglycemia (03 Nov 2019 09:41)    Patient was complaining of right sided abdominal pain had abdominal CT concerning for emphysematous pyelitis.     Urology Dr Camargo, surgery Dr Gray and ID DR Cuenca was consulted and recommended to transfer for IR nephrostomy tube placement     Transfer Center  was contacted and patient was accepted for transfer to [facility] by Dr Sow and Dr Andersen for further management

## 2019-11-03 NOTE — ED PROVIDER NOTE - OBJECTIVE STATEMENT
74 Female with PMH of Diabetes, HLD came to OSH for uncontrolled blood sugar levels. 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 Urology Dr Camargo, surgery Dr Gray and ID DR Cuenca was consulted and recommended to transfer for IR nephrostomy tube placement. Transfer Center  was contacted and patient was accepted for transfer to [facility] by Dr Sow and Dr Andersen for further management 74 Female with PMH of Diabetes, HLD came to OSH for uncontrolled blood sugar levels. 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 Urology Dr Camargo, surgery Dr Gray and ID DR Cuenca was consulted and recommended to transfer for IR nephrostomy tube placement. Pt was accepted for floor to floor tx, however in route found to be hypotensive and was given a fluid bolus and re-directed to the ED. Upon arrival here pt had hypotension however MAP greater than 65 with fluids running. Spoke with Dr. Andersen who accepted pt to floor and wanted IR to be called, spoke with IR fellow and they felt that this would be difficult for them to do percutaneously due to the air and asked that urology be called again. Pt already received Zosyn and had cultures drawn at OSH. Currently pt complaining or R flank pain without any other complaints.

## 2019-11-03 NOTE — CONSULT NOTE ADULT - SUBJECTIVE AND OBJECTIVE BOX
HPI    74 Female with PMH of Diabetes, HLD presented to  ED for sepsis 2/2 uncontrolled blood sugar levels. Pt has been taking metformin for her diabetes and last saw her PMD 3 months ago. She returned from [facility] a month ago and has not been follow up with any doctor due to lack of insurance. She has been taking metformin only that she got from [facility]. Today she started having nausea, vomiting and when checked her sugar it was very high. Denies fever, chest pain, shortness of breath, problems with urine or bowel. She has also felt right flank pain noted today which is mild, non-radiating, dull without aggravating factors. No trauma or heavy lifting.     Transferred to  for R NT.  Febrile, tachycardic and hypotensive here despite bolus of fluids.     PAST MEDICAL & SURGICAL HISTORY:  HLD (hyperlipidemia)  DM (diabetes mellitus)  No significant past surgical history    MEDICATIONS  (STANDING):  lactated ringers Bolus 1000 milliLiter(s) IV Bolus once    MEDICATIONS  (PRN):    FAMILY HISTORY: N/A    Allergies  Allergy Status Unknown    Intolerances    SOCIAL HISTORY: From [facility].    REVIEW OF SYSTEMS: Otherwise negative as stated in HPI    Physical Exam  Vital signs  T(C): 38.2 (19 @ 13:37), Max: 38.2 (19 @ 13:37)  HR: 124 (19 @ 13:50)  BP: 90/59 (19 @ 13:50)  SpO2: 98% (19 @ 13:50)  Wt(kg): --    Output    Physical Exam  Gen:  NAD    Pulm:  No respiratory distress  	  CV:  Sinus Tachycardia    GI:  Soft, NT, ND.  Lower midline scar well healed (from prior )    :  No suprapubic tenderness  No CVAT b/l  Garcia secured draining clear yellow urine    LABS:   @ 10:42  WBC 16.83 / Hct 34.5  / SCr 1.32      @ 02:54  WBC 13.80 / Hct 37.2  / SCr 1.43         137  |  106  |  14  ----------------------------<  525<HH>  3.8   |  24  |  1.32<H>    Ca    9.0      2019 10:42  Phos  2.1       Mg     1.6         TPro  7.4  /  Alb  3.2<L>  /  TBili  0.8  /  DBili  x   /  AST  12  /  ALT  21  /  AlkPhos  84      PT/INR - ( 2019 10:24 )   PT: 12.8 sec;   INR: 1.15 ratio      Urinalysis Basic - ( 2019 02:54 )    Color: Yellow / Appearance: Clear / S.010 / pH: x  Gluc: x / Ketone: Moderate  / Bili: Negative / Urobili: Negative   Blood: x / Protein: 30 mg/dL / Nitrite: Negative   Leuk Esterase: Small / RBC: >50 /HPF / WBC 3-5 /HPF   Sq Epi: x / Non Sq Epi: Occasional /HPF / Bacteria: Negative /HPF    Urine Cx: p  Blood Cx: p    RADIOLOGY:    < from: CT Abdomen and Pelvis No Cont (19 @ 08:27) >  FINDINGS:    LOWER CHEST: Mild bibasilar dependent changes. Coronary artery   calcifications. Mild cardiomegaly.    LIVER: Enlarged caudate lobe. Lobular contour. Consider cirrhosis.  BILE DUCTS: Normal caliber.  GALLBLADDER: Within normal limits.  SPLEEN: Mildlyenlarged measuring 13.9 cm sagittal. Splenic contour.  PANCREAS: Within normal limits.  ADRENALS: Within normal limits.  KIDNEYS/URETERS: There is moderate right hydroureteronephrosis down to   level urinary bladder with moderate right perinephric and periureteral   inflammatory stranding. No calculus is identified. There is air in the   right mid and upper pole calyces as well as within the right ureter.    BLADDER: Air is present in the urinary bladder.  REPRODUCTIVE ORGANS: Unremarkable uterus.    BOWEL: No bowel obstruction. Appendix is normal.  PERITONEUM: No ascites.  VESSELS: Within normal limits.  RETROPERITONEUM/LYMPH NODES: No lymphadenopathy.    ABDOMINAL WALL: 3.3 cm fat-containing umbilical hernia.  BONES: Mild thoracic degenerative changes. Grade 1 L4-5 anterolisthesis.    IMPRESSION:     Moderate right hydroureteronephrosis with perinephric and periureteral   fat stranding. Air within the right renal collecting system, right ureter   and urinary bladder without history of catheterization. 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    Surgical, urology and nephrology evaluation advised.    Enlarged hepatic caudate lobe and lobular hepatic contour suggestive of   cirrhosis.    Mild splenomegaly.    CRITICAL VALUE: I discussed the major findings in this report with TR Madden in ED Holding 11/3/2019 8:52 AM by Dr. Ramírez with readback.   Critical value policy of the hospital was followed. Read back and   confirmation of receipt of this communication was  performed. Thisverbal communication supplements the text report of this   document.    < end of copied text >

## 2019-11-03 NOTE — CONSULT NOTE ADULT - ATTENDING COMMENTS
emphysematous pyelo - air in renal pelvis/ureter/bladder   advise emergent pcn placement  not available here will need to be transferred to [facility]

## 2019-11-03 NOTE — H&P ADULT - PROBLEM SELECTOR PLAN 1
Uncontrolled diabetes  Started HSS and Lantus 15  F/u HbA1c  Social work consult for insurance problems Uncontrolled diabetes  Started HSS and Lantus 15  F/u HbA1c  Social work consult for insurance problems  Consulted Dr Andersen

## 2019-11-03 NOTE — DISCHARGE NOTE NURSING/CASE MANAGEMENT/SOCIAL WORK - PATIENT PORTAL LINK FT
You can access the FollowMyHealth Patient Portal offered by [facility] by registering at the following website: [email or web address]. By joining [employer]’s FollowMyHealth portal, you will also be able to view your health information using other applications (apps) compatible with our system.

## 2019-11-03 NOTE — DISCHARGE NOTE PROVIDER - NSDCCPCAREPLAN_GEN_ALL_CORE_FT
PRINCIPAL DISCHARGE DIAGNOSIS  Diagnosis: Emphysematous pyelitis  Assessment and Plan of Treatment: You are transferred today to higher level of care hospital for further management of your condition      SECONDARY DISCHARGE DIAGNOSES  Diagnosis: Hyperosmolar non-ketotic state in patient with type 2 diabetes mellitus  Assessment and Plan of Treatment: further treatment and and management

## 2019-11-03 NOTE — ED ADULT NURSE NOTE - CHIEF COMPLAINT QUOTE
transfer from [place] c/o low blood pressure 80's systolic while en route coming to the ED, was supposed to go to floor directly.

## 2019-11-03 NOTE — ED ADULT TRIAGE NOTE - CHIEF COMPLAINT QUOTE
transfer from [place] c/o low blood pressure 80's systolic while en route coming to the ED, was supposed to go to floor directly.

## 2019-11-03 NOTE — H&P ADULT - HISTORY OF PRESENT ILLNESS
74 Female with PMH of Diabetes, HLD came to hospital for uncontrolled blood sugar levels. Pt has been taking metformin for her diabetes and last saw her PMD 3 months ago. She returned from [place] a month ago and has not been follow up with any doctor due to lack of insurance. She has been taking metformin only that got from [facility]. Today she started having nausea, vomiting and when checked her sugar it was very high. Denies fever, chest irish, shortness of breath, problems with urine or bowel. She has also noted right flank pain noted today. No trauma or heavy lifting. 74 Female with PMH of Diabetes, HLD came to hospital for uncontrolled blood sugar levels. Pt has been taking metformin for her diabetes and last saw her PMD 3 months ago. She returned from [place] a month ago and has not been follow up with any doctor due to lack of insurance. She has been taking metformin only that she got from [facility]. Today she started having nausea, vomiting and when checked her sugar it was very high. Denies fever, chest pain, shortness of breath, problems with urine or bowel. She has also felt right flank pain noted today which is mild, non-radiating, dull without aggravating factors. No trauma or heavy lifting.     EKG showed sinus tachycardia.

## 2019-11-03 NOTE — ED ADULT NURSE NOTE - OBJECTIVE STATEMENT
75 y/o F, PMH DM (noncompliant with metformin x 1.5 months), HTN, transfer from [facility] for admit to floor for pyelonephritis. Pt went to [facility] for uncontrolled hyperglycemia, was c/o R abd pain and increasing abd distention while there, CT showed possible emphysematous pyelitis, pt was accepted and to be transferred to [address] [place] [address] to receive nephrostomy tube in IR but in transport with EMS pt developed hypotension, given 1L NS, and diverted to ED as per Admit doctors because she cannot come to floor with low BP. EMS reports pt had BCs drawn and received Zosyn at [facility]. Pt is AAOx4, NAD, resp even unlabored, abd rounded/distended, skin warm/dry, oral temp 100.7F, MD aware and pt will be medicated as ordered. Pt arrived with 16F urethral almonte secured to L upper thigh, with concentrated yellow urine present. Pt only c/o R flank pain, no other complaints. Daughter at bedside, safety maintained.

## 2019-11-04 DIAGNOSIS — E11.9 TYPE 2 DIABETES MELLITUS WITHOUT COMPLICATIONS: ICD-10-CM

## 2019-11-04 DIAGNOSIS — E78.5 HYPERLIPIDEMIA, UNSPECIFIED: ICD-10-CM

## 2019-11-04 DIAGNOSIS — N12 TUBULO-INTERSTITIAL NEPHRITIS, NOT SPECIFIED AS ACUTE OR CHRONIC: ICD-10-CM

## 2019-11-04 DIAGNOSIS — I10 ESSENTIAL (PRIMARY) HYPERTENSION: ICD-10-CM

## 2019-11-04 LAB
ANION GAP SERPL CALC-SCNC: 12 MMOL/L — SIGNIFICANT CHANGE UP (ref 5–17)
ANION GAP SERPL CALC-SCNC: 8 MMOL/L — SIGNIFICANT CHANGE UP (ref 5–17)
ANION GAP SERPL CALC-SCNC: 9 MMOL/L — SIGNIFICANT CHANGE UP (ref 5–17)
APTT BLD: 25.7 SEC — LOW (ref 27.5–36.3)
BUN SERPL-MCNC: 10 MG/DL — SIGNIFICANT CHANGE UP (ref 7–23)
BUN SERPL-MCNC: 11 MG/DL — SIGNIFICANT CHANGE UP (ref 7–23)
BUN SERPL-MCNC: 9 MG/DL — SIGNIFICANT CHANGE UP (ref 7–23)
CALCIUM SERPL-MCNC: 7.6 MG/DL — LOW (ref 8.4–10.5)
CALCIUM SERPL-MCNC: 7.6 MG/DL — LOW (ref 8.4–10.5)
CALCIUM SERPL-MCNC: 7.7 MG/DL — LOW (ref 8.4–10.5)
CHLORIDE SERPL-SCNC: 106 MMOL/L — SIGNIFICANT CHANGE UP (ref 96–108)
CHLORIDE SERPL-SCNC: 107 MMOL/L — SIGNIFICANT CHANGE UP (ref 96–108)
CHLORIDE SERPL-SCNC: 107 MMOL/L — SIGNIFICANT CHANGE UP (ref 96–108)
CO2 SERPL-SCNC: 17 MMOL/L — LOW (ref 22–31)
CO2 SERPL-SCNC: 18 MMOL/L — LOW (ref 22–31)
CO2 SERPL-SCNC: 20 MMOL/L — LOW (ref 22–31)
CREAT SERPL-MCNC: 0.87 MG/DL — SIGNIFICANT CHANGE UP (ref 0.5–1.3)
CREAT SERPL-MCNC: 0.96 MG/DL — SIGNIFICANT CHANGE UP (ref 0.5–1.3)
CREAT SERPL-MCNC: 1 MG/DL — SIGNIFICANT CHANGE UP (ref 0.5–1.3)
GAS PNL BLDA: SIGNIFICANT CHANGE UP
GLUCOSE BLDC GLUCOMTR-MCNC: 149 MG/DL — HIGH (ref 70–99)
GLUCOSE BLDC GLUCOMTR-MCNC: 162 MG/DL — HIGH (ref 70–99)
GLUCOSE BLDC GLUCOMTR-MCNC: 194 MG/DL — HIGH (ref 70–99)
GLUCOSE BLDC GLUCOMTR-MCNC: 221 MG/DL — HIGH (ref 70–99)
GLUCOSE BLDC GLUCOMTR-MCNC: 231 MG/DL — HIGH (ref 70–99)
GLUCOSE BLDC GLUCOMTR-MCNC: 232 MG/DL — HIGH (ref 70–99)
GLUCOSE BLDC GLUCOMTR-MCNC: 233 MG/DL — HIGH (ref 70–99)
GLUCOSE BLDC GLUCOMTR-MCNC: 238 MG/DL — HIGH (ref 70–99)
GLUCOSE BLDC GLUCOMTR-MCNC: 260 MG/DL — HIGH (ref 70–99)
GLUCOSE BLDC GLUCOMTR-MCNC: 275 MG/DL — HIGH (ref 70–99)
GLUCOSE BLDC GLUCOMTR-MCNC: 275 MG/DL — HIGH (ref 70–99)
GLUCOSE BLDC GLUCOMTR-MCNC: 294 MG/DL — HIGH (ref 70–99)
GLUCOSE BLDC GLUCOMTR-MCNC: 298 MG/DL — HIGH (ref 70–99)
GLUCOSE BLDC GLUCOMTR-MCNC: 316 MG/DL — HIGH (ref 70–99)
GLUCOSE BLDC GLUCOMTR-MCNC: 317 MG/DL — HIGH (ref 70–99)
GLUCOSE BLDC GLUCOMTR-MCNC: 347 MG/DL — HIGH (ref 70–99)
GLUCOSE BLDC GLUCOMTR-MCNC: 350 MG/DL — HIGH (ref 70–99)
GLUCOSE SERPL-MCNC: 260 MG/DL — HIGH (ref 70–99)
GLUCOSE SERPL-MCNC: 317 MG/DL — HIGH (ref 70–99)
GLUCOSE SERPL-MCNC: 370 MG/DL — HIGH (ref 70–99)
HAV IGM SER-ACNC: SIGNIFICANT CHANGE UP
HBV CORE IGM SER-ACNC: SIGNIFICANT CHANGE UP
HBV SURFACE AG SER-ACNC: SIGNIFICANT CHANGE UP
HCT VFR BLD CALC: 26.8 % — LOW (ref 34.5–45)
HCT VFR BLD CALC: 27.9 % — LOW (ref 34.5–45)
HCV AB S/CO SERPL IA: 0.12 S/CO — SIGNIFICANT CHANGE UP (ref 0–0.99)
HCV AB SERPL-IMP: SIGNIFICANT CHANGE UP
HGB BLD-MCNC: 9.1 G/DL — LOW (ref 11.5–15.5)
HGB BLD-MCNC: 9.2 G/DL — LOW (ref 11.5–15.5)
INR BLD: 1.36 RATIO — HIGH (ref 0.88–1.16)
MAGNESIUM SERPL-MCNC: 1.9 MG/DL — SIGNIFICANT CHANGE UP (ref 1.6–2.6)
MAGNESIUM SERPL-MCNC: 2.2 MG/DL — SIGNIFICANT CHANGE UP (ref 1.6–2.6)
MAGNESIUM SERPL-MCNC: 2.7 MG/DL — HIGH (ref 1.6–2.6)
MCHC RBC-ENTMCNC: 28.9 PG — SIGNIFICANT CHANGE UP (ref 27–34)
MCHC RBC-ENTMCNC: 29.5 PG — SIGNIFICANT CHANGE UP (ref 27–34)
MCHC RBC-ENTMCNC: 33 GM/DL — SIGNIFICANT CHANGE UP (ref 32–36)
MCHC RBC-ENTMCNC: 34 GM/DL — SIGNIFICANT CHANGE UP (ref 32–36)
MCV RBC AUTO: 87 FL — SIGNIFICANT CHANGE UP (ref 80–100)
MCV RBC AUTO: 87.7 FL — SIGNIFICANT CHANGE UP (ref 80–100)
NRBC # BLD: 0 /100 WBCS — SIGNIFICANT CHANGE UP (ref 0–0)
NRBC # BLD: 0 /100 WBCS — SIGNIFICANT CHANGE UP (ref 0–0)
PHOSPHATE SERPL-MCNC: 1.8 MG/DL — LOW (ref 2.5–4.5)
PHOSPHATE SERPL-MCNC: 3.1 MG/DL — SIGNIFICANT CHANGE UP (ref 2.5–4.5)
PHOSPHATE SERPL-MCNC: 5.5 MG/DL — HIGH (ref 2.5–4.5)
PLATELET # BLD AUTO: 149 K/UL — LOW (ref 150–400)
PLATELET # BLD AUTO: 178 K/UL — SIGNIFICANT CHANGE UP (ref 150–400)
POTASSIUM SERPL-MCNC: 3.4 MMOL/L — LOW (ref 3.5–5.3)
POTASSIUM SERPL-MCNC: 3.7 MMOL/L — SIGNIFICANT CHANGE UP (ref 3.5–5.3)
POTASSIUM SERPL-MCNC: 4.2 MMOL/L — SIGNIFICANT CHANGE UP (ref 3.5–5.3)
POTASSIUM SERPL-SCNC: 3.4 MMOL/L — LOW (ref 3.5–5.3)
POTASSIUM SERPL-SCNC: 3.7 MMOL/L — SIGNIFICANT CHANGE UP (ref 3.5–5.3)
POTASSIUM SERPL-SCNC: 4.2 MMOL/L — SIGNIFICANT CHANGE UP (ref 3.5–5.3)
PROTHROM AB SERPL-ACNC: 15.7 SEC — HIGH (ref 10–12.9)
RBC # BLD: 3.08 M/UL — LOW (ref 3.8–5.2)
RBC # BLD: 3.18 M/UL — LOW (ref 3.8–5.2)
RBC # FLD: 13.1 % — SIGNIFICANT CHANGE UP (ref 10.3–14.5)
RBC # FLD: 13.3 % — SIGNIFICANT CHANGE UP (ref 10.3–14.5)
SODIUM SERPL-SCNC: 133 MMOL/L — LOW (ref 135–145)
SODIUM SERPL-SCNC: 135 MMOL/L — SIGNIFICANT CHANGE UP (ref 135–145)
SODIUM SERPL-SCNC: 136 MMOL/L — SIGNIFICANT CHANGE UP (ref 135–145)
WBC # BLD: 12.22 K/UL — HIGH (ref 3.8–10.5)
WBC # BLD: 15.04 K/UL — HIGH (ref 3.8–10.5)
WBC # FLD AUTO: 12.22 K/UL — HIGH (ref 3.8–10.5)
WBC # FLD AUTO: 15.04 K/UL — HIGH (ref 3.8–10.5)

## 2019-11-04 PROCEDURE — 99223 1ST HOSP IP/OBS HIGH 75: CPT | Mod: GC

## 2019-11-04 PROCEDURE — 99231 SBSQ HOSP IP/OBS SF/LOW 25: CPT

## 2019-11-04 PROCEDURE — 93306 TTE W/DOPPLER COMPLETE: CPT | Mod: 26

## 2019-11-04 PROCEDURE — 99291 CRITICAL CARE FIRST HOUR: CPT

## 2019-11-04 PROCEDURE — 99232 SBSQ HOSP IP/OBS MODERATE 35: CPT

## 2019-11-04 RX ORDER — SODIUM,POTASSIUM PHOSPHATES 278-250MG
1 POWDER IN PACKET (EA) ORAL
Refills: 0 | Status: DISCONTINUED | OUTPATIENT
Start: 2019-11-04 | End: 2019-11-07

## 2019-11-04 RX ORDER — MAGNESIUM SULFATE 500 MG/ML
2 VIAL (ML) INJECTION ONCE
Refills: 0 | Status: COMPLETED | OUTPATIENT
Start: 2019-11-04 | End: 2019-11-04

## 2019-11-04 RX ORDER — DEXTROSE 50 % IN WATER 50 %
25 SYRINGE (ML) INTRAVENOUS
Refills: 0 | Status: DISCONTINUED | OUTPATIENT
Start: 2019-11-04 | End: 2019-11-05

## 2019-11-04 RX ORDER — INSULIN HUMAN 100 [IU]/ML
4 INJECTION, SOLUTION SUBCUTANEOUS
Qty: 100 | Refills: 0 | Status: DISCONTINUED | OUTPATIENT
Start: 2019-11-04 | End: 2019-11-05

## 2019-11-04 RX ORDER — PIPERACILLIN AND TAZOBACTAM 4; .5 G/20ML; G/20ML
3.38 INJECTION, POWDER, LYOPHILIZED, FOR SOLUTION INTRAVENOUS EVERY 8 HOURS
Refills: 0 | Status: DISCONTINUED | OUTPATIENT
Start: 2019-11-04 | End: 2019-11-04

## 2019-11-04 RX ORDER — INSULIN LISPRO 100/ML
4 VIAL (ML) SUBCUTANEOUS
Refills: 0 | Status: DISCONTINUED | OUTPATIENT
Start: 2019-11-04 | End: 2019-11-04

## 2019-11-04 RX ORDER — INSULIN LISPRO 100/ML
VIAL (ML) SUBCUTANEOUS
Refills: 0 | Status: DISCONTINUED | OUTPATIENT
Start: 2019-11-04 | End: 2019-11-04

## 2019-11-04 RX ORDER — PIPERACILLIN AND TAZOBACTAM 4; .5 G/20ML; G/20ML
3.38 INJECTION, POWDER, LYOPHILIZED, FOR SOLUTION INTRAVENOUS EVERY 8 HOURS
Refills: 0 | Status: DISCONTINUED | OUTPATIENT
Start: 2019-11-04 | End: 2019-11-06

## 2019-11-04 RX ORDER — ATORVASTATIN CALCIUM 80 MG/1
10 TABLET, FILM COATED ORAL AT BEDTIME
Refills: 0 | Status: DISCONTINUED | OUTPATIENT
Start: 2019-11-04 | End: 2019-11-08

## 2019-11-04 RX ORDER — POTASSIUM CHLORIDE 20 MEQ
10 PACKET (EA) ORAL
Refills: 0 | Status: COMPLETED | OUTPATIENT
Start: 2019-11-04 | End: 2019-11-04

## 2019-11-04 RX ORDER — INSULIN LISPRO 100/ML
VIAL (ML) SUBCUTANEOUS AT BEDTIME
Refills: 0 | Status: DISCONTINUED | OUTPATIENT
Start: 2019-11-04 | End: 2019-11-04

## 2019-11-04 RX ORDER — INSULIN GLARGINE 100 [IU]/ML
25 INJECTION, SOLUTION SUBCUTANEOUS ONCE
Refills: 0 | Status: COMPLETED | OUTPATIENT
Start: 2019-11-04 | End: 2019-11-04

## 2019-11-04 RX ORDER — INSULIN LISPRO 100/ML
8 VIAL (ML) SUBCUTANEOUS
Refills: 0 | Status: DISCONTINUED | OUTPATIENT
Start: 2019-11-04 | End: 2019-11-04

## 2019-11-04 RX ORDER — SODIUM CHLORIDE 9 MG/ML
1000 INJECTION, SOLUTION INTRAVENOUS
Refills: 0 | Status: DISCONTINUED | OUTPATIENT
Start: 2019-11-04 | End: 2019-11-05

## 2019-11-04 RX ORDER — INSULIN HUMAN 100 [IU]/ML
4 INJECTION, SOLUTION SUBCUTANEOUS ONCE
Refills: 0 | Status: COMPLETED | OUTPATIENT
Start: 2019-11-04 | End: 2019-11-04

## 2019-11-04 RX ADMIN — Medication 4 UNIT(S): at 15:50

## 2019-11-04 RX ADMIN — Medication 250 MILLIMOLE(S): at 00:25

## 2019-11-04 RX ADMIN — Medication 100 MILLIEQUIVALENT(S): at 07:15

## 2019-11-04 RX ADMIN — Medication 63.75 MILLIMOLE(S): at 15:48

## 2019-11-04 RX ADMIN — Medication 100 MILLIEQUIVALENT(S): at 03:32

## 2019-11-04 RX ADMIN — SODIUM CHLORIDE 50 MILLILITER(S): 9 INJECTION, SOLUTION INTRAVENOUS at 23:26

## 2019-11-04 RX ADMIN — Medication 100 MILLIEQUIVALENT(S): at 02:29

## 2019-11-04 RX ADMIN — Medication 100 MILLIEQUIVALENT(S): at 06:44

## 2019-11-04 RX ADMIN — Medication 250 MILLIMOLE(S): at 01:25

## 2019-11-04 RX ADMIN — SODIUM CHLORIDE 100 MILLILITER(S): 9 INJECTION, SOLUTION INTRAVENOUS at 09:44

## 2019-11-04 RX ADMIN — SODIUM CHLORIDE 50 MILLILITER(S): 9 INJECTION, SOLUTION INTRAVENOUS at 15:17

## 2019-11-04 RX ADMIN — Medication 50 GRAM(S): at 15:13

## 2019-11-04 RX ADMIN — INSULIN GLARGINE 25 UNIT(S): 100 INJECTION, SOLUTION SUBCUTANEOUS at 11:05

## 2019-11-04 RX ADMIN — Medication 100 MILLIEQUIVALENT(S): at 09:00

## 2019-11-04 RX ADMIN — ATORVASTATIN CALCIUM 10 MILLIGRAM(S): 80 TABLET, FILM COATED ORAL at 21:07

## 2019-11-04 RX ADMIN — Medication 6: at 15:49

## 2019-11-04 RX ADMIN — SODIUM CHLORIDE 50 MILLILITER(S): 9 INJECTION, SOLUTION INTRAVENOUS at 18:22

## 2019-11-04 RX ADMIN — CHLORHEXIDINE GLUCONATE 1 APPLICATION(S): 213 SOLUTION TOPICAL at 05:48

## 2019-11-04 RX ADMIN — INSULIN HUMAN 4 UNIT(S)/HR: 100 INJECTION, SOLUTION SUBCUTANEOUS at 07:16

## 2019-11-04 RX ADMIN — INSULIN HUMAN 4 UNIT(S): 100 INJECTION, SOLUTION SUBCUTANEOUS at 22:02

## 2019-11-04 RX ADMIN — PHENYLEPHRINE HYDROCHLORIDE 12.11 MICROGRAM(S)/KG/MIN: 10 INJECTION INTRAVENOUS at 07:16

## 2019-11-04 RX ADMIN — PIPERACILLIN AND TAZOBACTAM 25 GRAM(S): 4; .5 INJECTION, POWDER, LYOPHILIZED, FOR SOLUTION INTRAVENOUS at 02:16

## 2019-11-04 RX ADMIN — PIPERACILLIN AND TAZOBACTAM 25 GRAM(S): 4; .5 INJECTION, POWDER, LYOPHILIZED, FOR SOLUTION INTRAVENOUS at 18:22

## 2019-11-04 RX ADMIN — Medication 100 MILLIEQUIVALENT(S): at 04:37

## 2019-11-04 RX ADMIN — INSULIN HUMAN 4 UNIT(S)/HR: 100 INJECTION, SOLUTION SUBCUTANEOUS at 22:03

## 2019-11-04 RX ADMIN — Medication 4: at 21:07

## 2019-11-04 RX ADMIN — ENOXAPARIN SODIUM 40 MILLIGRAM(S): 100 INJECTION SUBCUTANEOUS at 12:05

## 2019-11-04 RX ADMIN — Medication 1 TABLET(S): at 18:22

## 2019-11-04 RX ADMIN — PIPERACILLIN AND TAZOBACTAM 25 GRAM(S): 4; .5 INJECTION, POWDER, LYOPHILIZED, FOR SOLUTION INTRAVENOUS at 09:44

## 2019-11-04 RX ADMIN — SODIUM CHLORIDE 125 MILLILITER(S): 9 INJECTION, SOLUTION INTRAVENOUS at 07:15

## 2019-11-04 NOTE — CONSULT NOTE ADULT - SUBJECTIVE AND OBJECTIVE BOX
HPI:  74 Female with PMH of poorly controlled type 2 diabetes mellitus without known complications, and HLD who presented to  ED for sepsis 2/2 uncontrolled blood sugar levels. Pt has been taking metformin for her diabetes and last saw her PMD 3 months ago. She returned from [place] a month ago and has not been follow up with any doctor due to lack of insurance. She has been taking metformin only that she got from [place]. Today she started having nausea, vomiting and when checked her sugar it was very high. Denies fever, chest pain, shortness of breath, problems with urine or bowel. She has also felt right flank pain noted today which is mild, non-radiating, dull without aggravating factors. No trauma or heavy lifting.     Transferred to  for R NT.  Febrile, tachycardic and hypotensive here on arrival (03 Nov 2019 15:06)    Consult History:  Patient speaks only Silvio, minimal English; refused  service, son-in-law provided translation at bedside.    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 Patient was hypotensive and tachycardic on arrival, and required multiple boluses of LR and pressor support. She was also found to have leukocytosis, meeting sepsis criteria. IR placed a right nephrostomy tube yesterday. Patient received potassium supplementation as was initially hypokalemic and then began on insulin infusion for HHS vs DKA at 4ml/hr at 22:24 last night. Sugars trended downward overnight until reaching 149, and insulin infusion started to be tapered. Patient received 25units lantus subQ this morning, with nurse noting that insulin infusion would be stopped ~2 hours after subQ insulin given. Patient also was restarted on a regular, consistent carbohydrate diet this morning after being NPO overnight.    Patient states that she was first diagnosed with type 2 diabetes ~12 years ago, and other than hyperlipidemia has otherwise been healthy. She states that she was prescribed metformin 500mg BID by her doctor in [facility], which she has been regularly taking, and never has had any acute events related to her diabetes in the past. She claims her blood sugars are "well controlled" at home on the metformin, indicating that her regular blood sugars at home are usually in the low 200's. She denies any vision changes or blurry vision. She has no history of diabetic neuropathy, and notes that her feet only get numb or tingling sensations in the cold. She has no known history of cardiac disease. She states she and her family realized that her doctor in [facility] "wasn't taking good care" of her diabetes, so [place] the [place] at the request of her family. Today, the patient states that she feels much better than yesterday; she denies any current nausea/vomiting. She notes that her flank pain on admission is still present, but reduced and only notable at times that she's getting up out of bed/chair. She also denies any current fevers/chills, fatigue, chest pain, palpitations, shortness of breath, diarrhea, constipation, problems with urination, or issues with at the site of the nephrostomy tube placement. Her only concern currently is having better control of her diabetes.    Review of Systems:  Constitutional: No fever, fatigue  Eyes: No blurry vision  Neuro: No tremors  HEENT: No pain  Cardiovascular: No chest pain, palpitations  Respiratory: No SOB, no cough  GI: + mild right flank pain, mostly when getting up; No nausea, vomiting, abdominal pain  : No dysuria, pelvic pain  Skin: no rash  Psych: no depression  Endocrine: no polyuria, polydipsia  Hem/lymph: no swelling  Osteoporosis: no fractures    ALL OTHER SYSTEMS REVIEWED AND NEGATIVE    PAST MEDICAL & SURGICAL HISTORY:  HLD (hyperlipidemia)  DM (diabetes mellitus)    No significant past surgical history      FAMILY HISTORY:  No direct family history of diabetes.    Social History:  Lives at home with her daughter, recently moved from [facility]. She has no current insurance so has not been to a doctor in about 3 months. She denies smoking or alcohol use.    Outpatient Medications:  metformin 500mg BID  atorvastatin 10mg    MEDICATIONS  (STANDING):  atorvastatin 10 milliGRAM(s) Oral at bedtime  chlorhexidine 2% Cloths 1 Application(s) Topical <User Schedule>  enoxaparin Injectable 40 milliGRAM(s) SubCutaneous daily  insulin lispro (HumaLOG) corrective regimen sliding scale   SubCutaneous three times a day before meals  insulin lispro (HumaLOG) corrective regimen sliding scale   SubCutaneous at bedtime  lactated ringers 1000 milliLiter(s) (100 mL/Hr) IV Continuous <Continuous>  phenylephrine    Infusion 0.5 MICROgram(s)/kG/Min (12.112 mL/Hr) IV Continuous <Continuous>  piperacillin/tazobactam IVPB.. 3.375 Gram(s) IV Intermittent every 8 hours  senna 2 Tablet(s) Oral at bedtime    MEDICATIONS  (PRN):  acetaminophen   Tablet .. 650 milliGRAM(s) Oral every 8 hours PRN Mild Pain (1 - 3)  polyethylene glycol 3350 17 Gram(s) Oral every 24 hours PRN Constipation      Allergies    No known allergies    Intolerances      PHYSICAL EXAM:  VITALS: T(C): 37 (11-04-19 @ 11:00)  T(F): 98.6 (11-04-19 @ 11:00), Max: 100.7 (11-03-19 @ 13:37)  HR: 99 (11-04-19 @ 11:30) (84 - 130)  BP: 101/55 (11-04-19 @ 11:30) (67/54 - 113/59)  RR:  (15 - 32)  SpO2:  (93% - 100%)  Wt(kg): --    GENERAL: NAD, resting sitting up in chair  EYES: No proptosis, no lid lag, anicteric  HEENT:  Atraumatic, Normocephalic, moist mucous membranes  THYROID: Normal size, no palpable nodules  RESPIRATORY: Clear to auscultation bilaterally; No rales, rhonchi, wheezing  CARDIOVASCULAR: Regular rate and rhythm; No murmurs; no peripheral edema  GI: Right nephrostomy tube in place, C/D/I; Soft, nontender, non distended, normal bowel sounds  SKIN: Dry, intact, No rashes or lesions  MUSCULOSKELETAL: Full range of motion, normal strength  NEURO: gross sensation intact, extraocular movements intact, no tremor  PSYCH: Alert and oriented x 3, appropriate affect      POCT Blood Glucose.: 232 mg/dL (11-04-19 @ 11:04)  POCT Blood Glucose.: 221 mg/dL (11-04-19 @ 10:13)  POCT Blood Glucose.: 149 mg/dL (11-04-19 @ 09:13)  POCT Blood Glucose.: 162 mg/dL (11-04-19 @ 07:57)  POCT Blood Glucose.: 194 mg/dL (11-04-19 @ 06:54)  POCT Blood Glucose.: 231 mg/dL (11-04-19 @ 06:14)  POCT Blood Glucose.: 260 mg/dL (11-04-19 @ 05:08)  POCT Blood Glucose.: 275 mg/dL (11-04-19 @ 04:01)  POCT Blood Glucose.: 298 mg/dL (11-04-19 @ 03:03)  POCT Blood Glucose.: 350 mg/dL (11-04-19 @ 02:12)  POCT Blood Glucose.: 347 mg/dL (11-04-19 @ 01:05)  POCT Blood Glucose.: 316 mg/dL (11-04-19 @ 00:20)  POCT Blood Glucose.: 327 mg/dL (11-03-19 @ 23:21)  POCT Blood Glucose.: 328 mg/dL (11-03-19 @ 22:22)  POCT Blood Glucose.: 490 mg/dL (11-03-19 @ 11:24)  POCT Blood Glucose.: 493 mg/dL (11-03-19 @ 08:29)  POCT Blood Glucose.: 494 mg/dL (11-03-19 @ 07:44)  POCT Blood Glucose.: 475 mg/dL (11-03-19 @ 03:59)  POCT Blood Glucose.: >600 mg/dL (11-03-19 @ 02:23)                            9.1    12.22 )-----------( 149      ( 04 Nov 2019 05:30 )             26.8       11-04    135  |  107  |  10  ----------------------------<  260<H>  3.7   |  20<L>  |  0.96    EGFR if : 68  EGFR if non : 59<L>    Ca    7.6<L>      11-04  Mg     2.2     11-04  Phos  3.1     11-04    TPro  5.9<L>  /  Alb  3.0<L>  /  TBili  1.0  /  DBili  x   /  AST  20  /  ALT  15  /  AlkPhos  80  11-03      Thyroid Function Tests:  11-03 @ 10:42 TSH 0.23 FreeT4 -- T3 -- Anti TPO -- Anti Thyroglobulin Ab -- TSI --      Hemoglobin A1C, Whole Blood: 10.6 % <H> [4.0 - 5.6] (11-03-19 @ 14:28)      11-03 Chol 130 LDL 62 HDL 45<L> Trig 114      Radiology:   CT Abdomen and Pelvis No Cont (11.03.19 @ 08:27)  IMPRESSION:     Moderate right hydroureteronephrosis with perinephric and periureteral   fat stranding. Air within the right renal collecting system, right ureter   and urinary bladder without history of catheterization. 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    Surgical, urology and nephrology evaluation advised.    Enlarged hepatic caudate lobe and lobular hepatic contour suggestive of   cirrhosis.    Mild splenomegaly. HPI:  74 Female with PMH of poorly controlled type 2 diabetes mellitus without known complications, and HLD who presented to  ED for sepsis 2/2 uncontrolled blood sugar levels. Pt has been taking metformin for her diabetes and last saw her PMD 3 months ago. She returned from [place] a month ago and has not been follow up with any doctor due to lack of insurance. She has been taking metformin only that she got from [place]. Today she started having nausea, vomiting and when checked her sugar it was very high. Denies fever, chest pain, shortness of breath, problems with urine or bowel. She has also felt right flank pain noted today which is mild, non-radiating, dull without aggravating factors. No trauma or heavy lifting.     Transferred to  for R NT.  Febrile, tachycardic and hypotensive here on arrival (03 Nov 2019 15:06)    Consult History:  Patient speaks only Silvio, minimal English; refused  service, son-in-law provided translation at bedside.    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 Patient was hypotensive and tachycardic on arrival, and required multiple boluses of LR and pressor support. She was also found to have leukocytosis, meeting sepsis criteria. IR placed a right nephrostomy tube yesterday. Patient received potassium supplementation as was initially hypokalemic and then began on insulin infusion for HHS vs DKA at 4ml/hr at 22:24 last night. Sugars trended downward overnight until reaching 149, and insulin infusion started to be tapered. Patient received 25units lantus subQ this morning, with nurse noting that insulin infusion would be stopped ~2 hours after subQ insulin given. Patient also was restarted on a regular, consistent carbohydrate diet this morning after being NPO overnight.    Patient states that she was first diagnosed with type 2 diabetes ~12 years ago, and other than hyperlipidemia has otherwise been healthy. She states that she was prescribed metformin 500mg BID by her doctor in [facility], which she has been regularly taking, and never has had any acute events related to her diabetes in the past. She claims her blood sugars are "well controlled" at home on the metformin, indicating that her regular blood sugars at home are usually in the low 200's. She denies any vision changes or blurry vision. She has no history of diabetic neuropathy, and notes that her feet only get numb or tingling sensations in the cold. She has no known history of cardiac disease. She states she and her family realized that her doctor in [facility] "wasn't taking good care" of her diabetes, so came [place] the [place] at the request of her family. Today, the patient states that she feels much better than yesterday; she denies any current nausea/vomiting. She notes that her flank pain on admission is still present, but reduced and only notable at times that she's getting up out of bed/chair. She also denies any current fevers/chills, fatigue, chest pain, palpitations, shortness of breath, diarrhea, constipation, problems with urination, or issues with at the site of the nephrostomy tube placement. Her only concern currently is having better control of her diabetes.    Review of Systems:  Constitutional: No fever, fatigue  Eyes: No blurry vision  Neuro: No tremors  HEENT: No pain  Cardiovascular: No chest pain, palpitations  Respiratory: No SOB, no cough  GI: + mild right flank pain, mostly when getting up; No nausea, vomiting, abdominal pain  : No dysuria, pelvic pain  Skin: no rash  Psych: no depression  Endocrine: no polyuria, polydipsia  Hem/lymph: no swelling  Osteoporosis: no fractures    ALL OTHER SYSTEMS REVIEWED AND NEGATIVE    PAST MEDICAL & SURGICAL HISTORY:  HLD (hyperlipidemia)  DM (diabetes mellitus)    No significant past surgical history      FAMILY HISTORY:  Multiple family members with diabetes.    Social History:  Lives at home with her daughter, recently moved from [place]. She has no current insurance so has not been to a doctor in about 3 months. She denies smoking or alcohol use.    Outpatient Medications:  metformin 500mg BID  atorvastatin 10mg    MEDICATIONS  (STANDING):  atorvastatin 10 milliGRAM(s) Oral at bedtime  chlorhexidine 2% Cloths 1 Application(s) Topical <User Schedule>  enoxaparin Injectable 40 milliGRAM(s) SubCutaneous daily  insulin lispro (HumaLOG) corrective regimen sliding scale   SubCutaneous three times a day before meals  insulin lispro (HumaLOG) corrective regimen sliding scale   SubCutaneous at bedtime  lactated ringers 1000 milliLiter(s) (100 mL/Hr) IV Continuous <Continuous>  phenylephrine    Infusion 0.5 MICROgram(s)/kG/Min (12.112 mL/Hr) IV Continuous <Continuous>  piperacillin/tazobactam IVPB.. 3.375 Gram(s) IV Intermittent every 8 hours  senna 2 Tablet(s) Oral at bedtime    MEDICATIONS  (PRN):  acetaminophen   Tablet .. 650 milliGRAM(s) Oral every 8 hours PRN Mild Pain (1 - 3)  polyethylene glycol 3350 17 Gram(s) Oral every 24 hours PRN Constipation      Allergies    No known allergies    Intolerances      PHYSICAL EXAM:  VITALS: T(C): 37 (11-04-19 @ 11:00)  T(F): 98.6 (11-04-19 @ 11:00), Max: 100.7 (11-03-19 @ 13:37)  HR: 99 (11-04-19 @ 11:30) (84 - 130)  BP: 101/55 (11-04-19 @ 11:30) (67/54 - 113/59)  RR:  (15 - 32)  SpO2:  (93% - 100%)  Wt(kg): --    GENERAL: NAD, resting sitting up in chair  EYES: No proptosis, no lid lag, anicteric  HEENT:  Atraumatic, Normocephalic, moist mucous membranes  THYROID: Normal size, no palpable nodules  RESPIRATORY: Clear to auscultation bilaterally; No rales, rhonchi, wheezing  CARDIOVASCULAR: Regular rate and rhythm; No murmurs; no peripheral edema  GI: Right nephrostomy tube in place, C/D/I; Soft, nontender, non distended, normal bowel sounds  SKIN: Dry, intact, No rashes or lesions  MUSCULOSKELETAL: Full range of motion, normal strength  NEURO: gross sensation intact, extraocular movements intact, no tremor  PSYCH: Alert and oriented x 3, appropriate affect      POCT Blood Glucose.: 232 mg/dL (11-04-19 @ 11:04)  POCT Blood Glucose.: 221 mg/dL (11-04-19 @ 10:13)  POCT Blood Glucose.: 149 mg/dL (11-04-19 @ 09:13)  POCT Blood Glucose.: 162 mg/dL (11-04-19 @ 07:57)  POCT Blood Glucose.: 194 mg/dL (11-04-19 @ 06:54)  POCT Blood Glucose.: 231 mg/dL (11-04-19 @ 06:14)  POCT Blood Glucose.: 260 mg/dL (11-04-19 @ 05:08)  POCT Blood Glucose.: 275 mg/dL (11-04-19 @ 04:01)  POCT Blood Glucose.: 298 mg/dL (11-04-19 @ 03:03)  POCT Blood Glucose.: 350 mg/dL (11-04-19 @ 02:12)  POCT Blood Glucose.: 347 mg/dL (11-04-19 @ 01:05)  POCT Blood Glucose.: 316 mg/dL (11-04-19 @ 00:20)  POCT Blood Glucose.: 327 mg/dL (11-03-19 @ 23:21)  POCT Blood Glucose.: 328 mg/dL (11-03-19 @ 22:22)  POCT Blood Glucose.: 490 mg/dL (11-03-19 @ 11:24)  POCT Blood Glucose.: 493 mg/dL (11-03-19 @ 08:29)  POCT Blood Glucose.: 494 mg/dL (11-03-19 @ 07:44)  POCT Blood Glucose.: 475 mg/dL (11-03-19 @ 03:59)  POCT Blood Glucose.: >600 mg/dL (11-03-19 @ 02:23)                            9.1    12.22 )-----------( 149      ( 04 Nov 2019 05:30 )             26.8       11-04    135  |  107  |  10  ----------------------------<  260<H>  3.7   |  20<L>  |  0.96    EGFR if : 68  EGFR if non : 59<L>    Ca    7.6<L>      11-04  Mg     2.2     11-04  Phos  3.1     11-04    TPro  5.9<L>  /  Alb  3.0<L>  /  TBili  1.0  /  DBili  x   /  AST  20  /  ALT  15  /  AlkPhos  80  11-03      Thyroid Function Tests:  11-03 @ 10:42 TSH 0.23 FreeT4 -- T3 -- Anti TPO -- Anti Thyroglobulin Ab -- TSI --      Hemoglobin A1C, Whole Blood: 10.6 % <H> [4.0 - 5.6] (11-03-19 @ 14:28)      11-03 Chol 130 LDL 62 HDL 45<L> Trig 114      Radiology:   CT Abdomen and Pelvis No Cont (11.03.19 @ 08:27)  IMPRESSION:     Moderate right hydroureteronephrosis with perinephric and periureteral   fat stranding. Air within the right renal collecting system, right ureter   and urinary bladder without history of catheterization. 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    Surgical, urology and nephrology evaluation advised.    Enlarged hepatic caudate lobe and lobular hepatic contour suggestive of   cirrhosis.    Mild splenomegaly. HPI:  74 Female with PMH of poorly controlled type 2 diabetes mellitus without known complications, and HLD who presented to  ED for sepsis 2/2 uncontrolled blood sugar levels. Pt has been taking metformin for her diabetes and last saw her PMD 3 months ago. She returned from [place] a month ago and has not been follow up with any doctor due to lack of insurance. She has been taking metformin only that she got from [place]. Today she started having nausea, vomiting and when checked her sugar it was very high. Denies fever, chest pain, shortness of breath, problems with urine or bowel. She has also felt right flank pain noted today which is mild, non-radiating, dull without aggravating factors. No trauma or heavy lifting.     Transferred to  for R NT.  Febrile, tachycardic and hypotensive here on arrival (03 Nov 2019 15:06)    Consult History:  Patient speaks only Silvio, minimal English; refused  service, son-in-law provided translation at bedside.    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 Patient was hypotensive and tachycardic on arrival, and required multiple boluses of LR and pressor support. She was also found to have leukocytosis, meeting sepsis criteria. IR placed a right nephrostomy tube yesterday. Patient received potassium supplementation as was initially hypokalemic and then began on insulin infusion for HHS vs DKA at 4ml/hr at 22:24 last night. Sugars trended downward overnight until reaching 149, and insulin infusion started to be tapered. Patient received 25units lantus subQ this morning, with nurse noting that insulin infusion would be stopped ~2 hours after subQ insulin given. Patient also was restarted on a regular, consistent carbohydrate diet this morning after being NPO overnight.    Patient states that she was first diagnosed with type 2 diabetes ~12 years ago, and other than hyperlipidemia has otherwise been healthy. She states that she was prescribed metformin 500mg BID by her doctor in [facility], which she has been regularly taking, and never has had any acute events related to her diabetes in the past. She claims her blood sugars are "well controlled" at home on the metformin, indicating that her regular blood sugars at home are usually in the low 200's. She denies any vision changes or blurry vision. She has no history of diabetic neuropathy, and notes that her feet only get numb or tingling sensations in the cold. She has no known history of cardiac disease. She states she and her family realized that her doctor in [facility] "wasn't taking good care" of her diabetes, so came [place] the [place] at the request of her family. Today, the patient states that she feels much better than yesterday; she denies any current nausea/vomiting. She notes that her flank pain on admission is still present, but reduced and only notable at times that she's getting up out of bed/chair. She also denies any current fevers/chills, fatigue, chest pain, palpitations, shortness of breath, diarrhea, constipation, problems with urination, or issues with at the site of the nephrostomy tube placement. Her only concern currently is having better control of her diabetes.    Review of Systems:  Constitutional: No fever, fatigue  Eyes: No blurry vision  Neuro: No tremors  HEENT: No pain  Cardiovascular: No chest pain, palpitations  Respiratory: No SOB, no cough  GI: + mild right flank pain, mostly when getting up; No nausea, vomiting, abdominal pain  : No dysuria, pelvic pain  Skin: no rash  Psych: no depression  Endocrine: no polyuria, polydipsia  Hem/lymph: no swelling  Osteoporosis: no fractures    ALL OTHER SYSTEMS REVIEWED AND NEGATIVE    PAST MEDICAL & SURGICAL HISTORY:  HLD (hyperlipidemia)  DM (diabetes mellitus)    No significant past surgical history      FAMILY HISTORY:  Multiple family members with diabetes, including daughter.    Social History:  Lives at home with her daughter, recently moved from [facility]. She has no current insurance so has not been to a doctor in about 3 months. She denies smoking or alcohol use.    Outpatient Medications:  metformin 500mg BID  atorvastatin 10mg    MEDICATIONS  (STANDING):  atorvastatin 10 milliGRAM(s) Oral at bedtime  chlorhexidine 2% Cloths 1 Application(s) Topical <User Schedule>  enoxaparin Injectable 40 milliGRAM(s) SubCutaneous daily  insulin lispro (HumaLOG) corrective regimen sliding scale   SubCutaneous three times a day before meals  insulin lispro (HumaLOG) corrective regimen sliding scale   SubCutaneous at bedtime  lactated ringers 1000 milliLiter(s) (100 mL/Hr) IV Continuous <Continuous>  phenylephrine    Infusion 0.5 MICROgram(s)/kG/Min (12.112 mL/Hr) IV Continuous <Continuous>  piperacillin/tazobactam IVPB.. 3.375 Gram(s) IV Intermittent every 8 hours  senna 2 Tablet(s) Oral at bedtime    MEDICATIONS  (PRN):  acetaminophen   Tablet .. 650 milliGRAM(s) Oral every 8 hours PRN Mild Pain (1 - 3)  polyethylene glycol 3350 17 Gram(s) Oral every 24 hours PRN Constipation      Allergies    No known allergies          PHYSICAL EXAM:  VITALS: T(C): 37 (11-04-19 @ 11:00)  T(F): 98.6 (11-04-19 @ 11:00), Max: 100.7 (11-03-19 @ 13:37)  HR: 99 (11-04-19 @ 11:30) (84 - 130)  BP: 101/55 (11-04-19 @ 11:30) (67/54 - 113/59)  RR:  (15 - 32)  SpO2:  (93% - 100%)  Wt(kg): --    GENERAL: NAD, resting sitting up in chair  EYES: No proptosis, no lid lag, anicteric  HEENT:  Atraumatic, Normocephalic, moist mucous membranes  THYROID: Normal size, no palpable nodules  RESPIRATORY: Clear to auscultation bilaterally; No rales, rhonchi, wheezing  CARDIOVASCULAR: Regular rate and rhythm; No murmurs; no peripheral edema  GI: Right nephrostomy tube in place, C/D/I; Soft, nontender, non distended, normal bowel sounds  SKIN: Dry, intact, No rashes or lesions on feet b/l  MUSCULOSKELETAL: Full range of motion, normal strength  NEURO: gross sensation intact, extraocular movements intact, +head bobble  PSYCH: Alert and oriented x 3, appropriate affect      POCT Blood Glucose.: 232 mg/dL (11-04-19 @ 11:04)  POCT Blood Glucose.: 221 mg/dL (11-04-19 @ 10:13)  POCT Blood Glucose.: 149 mg/dL (11-04-19 @ 09:13)  POCT Blood Glucose.: 162 mg/dL (11-04-19 @ 07:57)  POCT Blood Glucose.: 194 mg/dL (11-04-19 @ 06:54)  POCT Blood Glucose.: 231 mg/dL (11-04-19 @ 06:14)  POCT Blood Glucose.: 260 mg/dL (11-04-19 @ 05:08)  POCT Blood Glucose.: 275 mg/dL (11-04-19 @ 04:01)  POCT Blood Glucose.: 298 mg/dL (11-04-19 @ 03:03)  POCT Blood Glucose.: 350 mg/dL (11-04-19 @ 02:12)  POCT Blood Glucose.: 347 mg/dL (11-04-19 @ 01:05)  POCT Blood Glucose.: 316 mg/dL (11-04-19 @ 00:20)  POCT Blood Glucose.: 327 mg/dL (11-03-19 @ 23:21)  POCT Blood Glucose.: 328 mg/dL (11-03-19 @ 22:22)  POCT Blood Glucose.: 490 mg/dL (11-03-19 @ 11:24)  POCT Blood Glucose.: 493 mg/dL (11-03-19 @ 08:29)  POCT Blood Glucose.: 494 mg/dL (11-03-19 @ 07:44)  POCT Blood Glucose.: 475 mg/dL (11-03-19 @ 03:59)  POCT Blood Glucose.: >600 mg/dL (11-03-19 @ 02:23)                            9.1    12.22 )-----------( 149      ( 04 Nov 2019 05:30 )             26.8       11-04    135  |  107  |  10  ----------------------------<  260<H>  3.7   |  20<L>  |  0.96    EGFR if : 68  EGFR if non : 59<L>    Ca    7.6<L>      11-04  Mg     2.2     11-04  Phos  3.1     11-04    TPro  5.9<L>  /  Alb  3.0<L>  /  TBili  1.0  /  DBili  x   /  AST  20  /  ALT  15  /  AlkPhos  80  11-03      Thyroid Function Tests:  11-03 @ 10:42 TSH 0.23 FreeT4 -- T3 -- Anti TPO -- Anti Thyroglobulin Ab -- TSI --      Hemoglobin A1C, Whole Blood: 10.6 % <H> [4.0 - 5.6] (11-03-19 @ 14:28)      11-03 Chol 130 LDL 62 HDL 45<L> Trig 114      Radiology:   CT Abdomen and Pelvis No Cont (11.03.19 @ 08:27)  IMPRESSION:     Moderate right hydroureteronephrosis with perinephric and periureteral   fat stranding. Air within the right renal collecting system, right ureter   and urinary bladder without history of catheterization. 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    Surgical, urology and nephrology evaluation advised.    Enlarged hepatic caudate lobe and lobular hepatic contour suggestive of   cirrhosis.    Mild splenomegaly.

## 2019-11-04 NOTE — PROGRESS NOTE ADULT - SUBJECTIVE AND OBJECTIVE BOX
HISTORY:  74 year old female with a PMHx not limited to DM type II and HLD (rest unknown) who presented initially to [facility] on 11/3 with glucose in the 600s, nausea/vomiting, and right flank pain. She was only taking metformin that was prescribed in [place] and had not been following up with doctors in the [place] due to lack of insurance. Labs were significant for a leukocytosis. She was cultured, started on antibiotics, given 3 L of crystalloid, and admitted to medicine for glycemic control. CT abdomen/pelvis was obtained for her right flank pain, which revealed right emphysematous pyelonephritis. Patient was subsequently transferred to [facility] for emergent right nephrostomy tube placement with IR. She became hypotensive and was given an additional liter of crystalloid before being started on vasopressor support. Patient was admitted to SICU for hemodynamic monitoring.    24 HOUR EVENTS:  - POCUS demonstrated hypovolemia so given 1 L of LR but remains on vasopressor support with phenylephrine  - Started on insulin infusion for HHS vs. DKA  - Hepatitis panel sent due to evidence of cirrhosis on CT scan  - Repeat blood cultures sent  - Right nephrostomy tube with minimal output, urology aware HISTORY:  74 year old female with a PMHx not limited to DM type II and HLD (rest unknown) who presented initially to [facility] on 11/3 with glucose in the 600s, nausea/vomiting, and right flank pain. She was only taking metformin that was prescribed in [place] and has not been following up with doctors in the [place] due to lack of insurance. Labs were significant for a leukocytosis. She was cultured, started on antibiotics, given 3 L of crystalloid, and admitted to medicine for glycemic control. CT abdomen/pelvis was obtained for her right flank pain, which revealed right emphysematous pyelonephritis. Patient was subsequently transferred to [facility] for emergent right nephrostomy tube placement with IR. She became hypotensive and was given an additional liter of crystalloid before being started on vasopressor support. Patient was admitted to SICU for hemodynamic monitoring.    24 HOUR EVENTS:  - POCUS demonstrated hypovolemia so given 1 L of LR but remains on vasopressor support with phenylephrine  - Started on insulin infusion for HHS vs. DKA  - Hepatitis panel sent due to evidence of cirrhosis on CT scan  - Repeat blood cultures sent as blood cultures from 11/3 were positive for E. coli    SUBJECTIVE/ROS:  [x] A ten-point review of systems was otherwise negative except as noted.  [ ] Due to altered mental status/intubation, subjective information were not able to be obtained from the patient. History was obtained, to the extent possible, from review of the chart and collateral sources of information.    NEURO  Exam: awake, alert, oriented x4, no acute distress, no focal deficits  Meds: acetaminophen   Tablet .. 650 milliGRAM(s) Oral every 8 hours PRN Mild Pain (1 - 3)  [x] Adequacy of sedation and pain control has been assessed and adjusted    RESPIRATORY  RR: 18 (11-04-19 @ 04:15) (15 - 29)  SpO2: 96% (11-04-19 @ 04:15) (93% - 99%)  Exam: clear to auscultation bilaterally  Mechanical Ventilation: no  [N/A] Extubation Readiness Assessed  Meds: none    CARDIOVASCULAR  HR: 88 (11-04-19 @ 04:15) (84 - 130)  BP: 91/56 (11-04-19 @ 02:15) (67/54 - 158/79)  BP(mean): 68 (11-04-19 @ 02:15) (68 - 80)  ABP: 99/56 (11-04-19 @ 04:15) (87/51 - 148/138)  ABP(mean): 73 (11-04-19 @ 04:15) (66 - 144)  ABG - ( 04 Nov 2019 01:12 )  pH: 7.36  /  pCO2: 38    /  pO2: 62    / HCO3: 20    / Base Excess: -4.1  /  SaO2: 91    /    Lactate: 1.2  Exam: regular rate and rhythm, S1S2  Cardiac Rhythm: sinus  Perfusion    [x]Adequate    [ ]Inadequate  Mentation   [x]Normal       [ ]Reduced  Extremities  [X]Warm         [ ]Cool  Volume Status [ ]Hypervolemic [x]Euvolemic [ ]Hypovolemic  Meds: phenylephrine    Infusion 0.5 MICROgram(s)/kG/Min IV Continuous <Continuous>    GI/NUTRITION  Exam: soft, nontender, nondistended, right nephrostomy tube with pink lemonade-colored urine  Diet: NPO except medications  Meds:  - polyethylene glycol 3350 17 Gram(s) Oral every 24 hours PRN Constipation  - senna 2 Tablet(s) Oral at bedtime    GENITOURINARY  I&O's Detail    11-03 @ 07:01  -  11-04 @ 04:22  --------------------------------------------------------  IN:    insulin regular Infusion: 35 mL    IV PiggyBack: 1250 mL    Lactated Ringers IV Bolus: 1000 mL    lactated ringers.: 1225 mL    norepinephrine Infusion: 15 mL    phenylephrine   Infusion: 98.1 mL    Solution: 275 mL  Total IN: 3898.1 mL    OUT:    Indwelling Catheter - Urethral: 940 mL    T-Tube: 100 mL  Total OUT: 1040 mL    Total NET: 2858.1 mL    136  |  106  |  11  ----------------------------<  370<H>  3.4<L>   |  18<L>  |  1.00    Ca    7.7<L>      04 Nov 2019 01:16  Phos  5.5  Mg     2.7    [x] Garcia catheter, indication: urine output monitoring in the critically ill  Meds: lactated ringers infuse at 125 mL/hr    HEMATOLOGIC  Meds: enoxaparin Injectable 40 milliGRAM(s) SubCutaneous daily  [x] VTE Prophylaxis                        9.2    15.04 )-----------( 178      ( 04 Nov 2019 01:16 )             27.9     PT/INR - ( 04 Nov 2019 01:16 )   PT: 15.7 sec;   INR: 1.36 ratio    PTT - ( 04 Nov 2019 01:16 )  PTT:25.7 sec    INFECTIOUS DISEASES  T(C): 37.1 (11-04-19 @ 03:00), Max: 38.2 (11-03-19 @ 13:37)  WBC Count:  - 15.04 K/uL (11-04 @ 01:16)  - 24.46 K/uL (11-03 @ 18:42)  - 14.15 K/uL (11-03 @ 15:20)  - 16.83 K/uL (11-03 @ 10:42)  Recent Cultures:  - Specimen Source: .Blood, 11-03 @ 15:07  Results: Growth in anaerobic & aerobic bottle: Gram Negative Rods  Gram Stain: Growth in anaerobic & aerobic bottle: Gram Negative Rods  Organism: Blood Culture PCR  Meds: piperacillin/tazobactam IVPB.. 3.375 Gram(s) IV Intermittent every 8 hours    ENDOCRINE  Capillary Blood Glucose:  - 275 mg/dL (04 Nov 2019 04:01)  - 298 mg/dL (04 Nov 2019 03:03)  - 350 mg/dL (04 Nov 2019 02:12)  - 347 mg/dL (04 Nov 2019 01:05)  - 316 mg/dL (04 Nov 2019 00:20)  - 327 mg/dL (03 Nov 2019 23:21)  - 328 mg/dL (03 Nov 2019 22:22)  - 490 mg/dL (03 Nov 2019 11:24)  - 493 mg/dL (03 Nov 2019 08:29)  - 494 mg/dL (03 Nov 2019 07:44)  Meds: insulin regular Infusion 4 Unit(s)/Hr IV Continuous <Continuous>    ACCESS DEVICES:  [x] Peripheral IV  [ ] Central Venous Line	[ ] R	[ ] L	[ ] IJ	[ ] Fem	[ ] SC	Placed:   [x] Arterial Line		[ ] R	[x] L	[ ] Fem	[x] Rad	[ ] Ax	Placed: 11/3  [ ] PICC:					[ ] Mediport  [x] Urinary Catheter, Date Placed: 11/3  [x] Necessity of urinary, arterial, and venous catheters discussed    OTHER MEDICATIONS: chlorhexidine 2% Cloths 1 Application(s) Topical <User Schedule>    CODE STATUS:     IMAGING:

## 2019-11-04 NOTE — PHYSICAL THERAPY INITIAL EVALUATION ADULT - ADDITIONAL COMMENTS
Per patient, she lives in apartment with daughter, no stairs to enter, she ambulates with a rolling walker

## 2019-11-04 NOTE — PROGRESS NOTE ADULT - ASSESSMENT
A/P: 74y Female s/p R PCN placement by Interventional Radiology for emphysematous pyelitis  - Abx  - wean pressors  - wean insulin gtt  - f/u cultures  - OOB  - diet   - SICU care

## 2019-11-04 NOTE — PHYSICAL THERAPY INITIAL EVALUATION ADULT - TRANSFER TRAINING, PT EVAL
GOAL: Patient will perform transfers independently with least restrictive assistive device in 2 weeks

## 2019-11-04 NOTE — CONSULT NOTE ADULT - PROBLEM SELECTOR RECOMMENDATION 9
- poorly controlled on metformin 500mg BID at home with acute hyperglycemic episode on this admission, HA1C 10.6  - glucose improved to < 200 overnight, patient currently being switched to subQ insulin after 4mL/hr insulin infusion overnight; given 25units Lantus this morning  - keep insulin infusion continuing for 2 hours after subQ insulin given  - with initiation of eating, would adjust to basal-bolus insulin regimen; since insulin naive, can keep about same low amount of insulin (to help prevent hypoglycemia) based on weight, but would change to 14 U lantus with 4 units humalog with meals  - continue moderate correctional sliding scale insulin  - continue to closely monitor FS for re-elevation or potential hypoglycemia  - will adjust basal-bolus regimen based on initial FSs with start of subQ insulin  - Dispo: patient does not have endocrinologist, should follow up after discharge with [facility] endocrinology clinic for continued diabetes management; likely to benefit in increase on d/c to metformin 1000mg BID, with further additions if necessary on f/u. - poorly controlled on metformin 500mg BID at home with acute hyperglycemic episode on this admission, HA1C 10.6  - glucose improved to < 200 overnight, patient currently being switched to subQ insulin after 4mL/hr insulin infusion overnight; given 25units Lantus this morning  - keep insulin infusion continuing for 2 hours after subQ insulin given  - with initiation of eating, would adjust to basal-bolus insulin regimen; since insulin naive, and based on insulin reqs would give 25 units lantus basal, add 8units humalog with meals.  - continue to closely monitor FS for re-elevation or potential hypoglycemia  - will adjust basal-bolus regimen based on initial FSs with start of subQ insulin  - Dispo: patient does not have endocrinologist, should follow up after discharge with [facility] endocrinology clinic vs. [place] based on ease for patient. Patient also needs insurance set up, nai. if going to be living here. Will likely need insulin on discharge given HA1C > 10. - poorly controlled on metformin 500mg BID at home with acute hyperglycemic episode on this admission, HA1C 10.6  - glucose improved to < 200 overnight, patient currently being switched to subQ insulin after 4mL/hr insulin infusion overnight; given 25units Lantus this morning  - keep insulin infusion continuing for 2 hours after subQ insulin given  - with initiation of eating, would adjust to basal-bolus insulin regimen; since insulin naive and based on IV insulin reqs over last 6 hrs, would give 25 units lantus basal, add 8units humalog with meals.  - should get basal at night - can give 16 units NPH tomorrow morning and then 25 units at night  - continue to closely monitor FS for re-elevation or potential hypoglycemia  - will adjust basal-bolus regimen based on initial FSs with start of subQ insulin  - Dispo: patient does not have endocrinologist, should follow up after discharge with [facility] endocrinology clinic vs. [place] based on ease for patient. Patient also needs insurance set up, nai. if going to be living here. Will likely need insulin on discharge given HA1C > 10.

## 2019-11-04 NOTE — PROGRESS NOTE ADULT - SUBJECTIVE AND OBJECTIVE BOX
Interventional Radiology    S/P Right percutaneous nephrostomy tube placement, POD # 1.      Pt's son in law was present to translate for Tamil.  Pt denies N/V, or abdominal pain.  She reported that she did not like the way the food tasted during her breakfast so she did not eat that much food in AM.      Pt is currently on 1 pressor (phenylephrine).     Vital Signs Last 24 Hrs  T(C): 37 (04 Nov 2019 11:00), Max: 38.2 (03 Nov 2019 13:37)  T(F): 98.6 (04 Nov 2019 11:00), Max: 100.7 (03 Nov 2019 13:37)  HR: 99 (04 Nov 2019 11:30) (84 - 130)  BP: 101/55 (04 Nov 2019 11:30) (67/54 - 113/59)  BP(mean): 81 (04 Nov 2019 11:00) (66 - 81)  RR: 32 (04 Nov 2019 11:00) (15 - 32)  SpO2: 97% (04 Nov 2019 11:30) (93% - 100%)    General Appearance: NAD, seen sitting in chair, awake and alert    Procedure Site (Right flank): catheter was flushed without difficulty.  Draining yellow urine to leg bag.      I&O's Detail    Right     Nephrostomy Tube: 100 mL    LABS:                        9.1    12.22 )-----------( 149      ( 04 Nov 2019 05:30 )             26.8     11-04    135  |  107  |  10  ----------------------------<  260<H>  3.7   |  20<L>  |  0.96    Ca    7.6<L>      04 Nov 2019 05:30  Phos  3.1     11-04  Mg     2.2     11-04    TPro  5.9<L>  /  Alb  3.0<L>  /  TBili  1.0  /  DBili  x   /  AST  20  /  ALT  15  /  AlkPhos  80  11-03    Culture - Urine (11.03.19 @ 15:02)    Specimen Source: .Urine    Culture Results:   >100,000 CFU/ml Gram Negative Rods      A/P  74y Female with E. Coli Bacteremia, Right emphysematous pyelonephritis S/P Right perc nephrostomy (PCN)    Continue global medical management as per SICU team/primary team.    Forward flush the catheter daily as ordered, maintain dressing over the site. monitor and record daily outputs from R PCN.    Will discuss with IR attending.    SON Dumont  [facility] [id number]  Ext [phone]

## 2019-11-04 NOTE — PHYSICAL THERAPY INITIAL EVALUATION ADULT - PERTINENT HX OF CURRENT PROBLEM, REHAB EVAL
74y Female s/p R PCN placement by Interventional Radiology for emphysematous pyelitis. became hypotensive and was given an additional liter of crystalloid before being started on vasopressor support. Patient was admitted to SICU for hemodynamic monitoring. 11/4: Off pressors

## 2019-11-04 NOTE — PHYSICAL THERAPY INITIAL EVALUATION ADULT - GAIT TRAINING, PT EVAL
GOAL: Patient will ambulate 300 feet independently with least restrictive assistive device in 2 weeks

## 2019-11-04 NOTE — CONSULT NOTE ADULT - ASSESSMENT
73 yo female with poorly controlled type 2 diabetes and HLD admitted for emphysematous pyelitis and likely HHS. Patient also noted to be septic on admission, but is improving in overall status. She will require continued monitoring and eventual follow-up with an endocrinologist for better control of her diabetes.
74 year old female with uncontrolled DM2, HTN, HLD presented to  ED with sepsis.  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  Hypotensive and tachycardic.  Needs decompression of R kidney to decrease risk of progression to emphysematous pyelonephritis.    Prefer IR NT to be placed into R kidney given the risks with general anesthesia with severe sepsis currently.  Discussed w/ IR fellow.    -- IV abx  -- Continue almonte for now  -- f/u urine and blood cultures  -- f/u IR regarding placement of R NT.  -- Admitted to medicine for further management of diabetes  -- d/w Dr. Mcdonnell
75 y/o with PMhx of HLD, DM presenting with r. flank pain found to have right emphysematous pyelonephritis in DKA     Neuro:   - monitor mental status  - Tylenol and PRN Dilaudid for pain control     Resp:   - monitor vital signs   - currently saturating well on room air  - f/u ABG    Cardiac:   - monitor BP   - A-line in place  - wean william as tolerated     GI:   - NPO     Renal  - strict Is and Os   - maintain almonte  -monitor nephrostomy tube output   -monitor Cr   - c/w LR 125cc/hr  - replete electrolytes     Heme:   - monitor H/H  - DVT ppx     ID:  - f/u cultures   - monitor WBC count  - c/w zosyn     Endo:   - likely in DKA  - titrate insulin drip  - monitor FSG

## 2019-11-04 NOTE — PROGRESS NOTE ADULT - ASSESSMENT
75 y/o female presenting with HHS vs. DKA, septic shock, E. coli bacteremia, and right emphysematous pyelonephritis s/p right nephrostomy tube placement    PLAN:    Neuro: acute post-procedure pain  - Monitor mental status  - Pain control as needed with acetaminophen (will not give more than 2 grams daily in the setting of cirrhosis)    Resp: no acute issues  - Monitor pulse oximeter  - Out of bed to chair, ambulate as tolerated, and incentive spirometry to prevent atelectasis    CV: septic shock  - Monitor vital signs  - Wean phenylephrine infusion as tolerated with goal MAP greater than 65 mmHg  - Lactate cleared from 4.8 to 1.2    GI: no acute issues  - NPO except medications, can advance to regular diet when more stable  - Bowel regimen with senna and Miralax    Renal: JEFF on likely CKD, right emphysematous pyelonephritis  - Monitor I&Os  - Monitor electrolytes and replete as necessary  - LR at 125 mL/hr while NPO, hypotensive on vasopressor support, and being resuscitate for HHS vs. DKA  - Monitor right nephrostomy drain output    Heme: normocytic anemia  - Monitor CBC and coags, downtrending HCT likely secondary to resuscitation with crystalloid  - Lovenox for VTE prophylaxis    ID: E. coli bacteremia, right emphysematous pyelonephritis  - Monitor WBC, temperature, and procalcitonin  - Blood cultures from 11/3 collected at [facility] positive for E. coli, blood cultures from 11/4 and urine cultures from 11/3 & 11/4 pending  - Follow up E. coli sensitivities  - Empiric antibiotics with Zosyn    Endo: DM type II, HSS vs. DKA  - Insulin infusion for HSS vs. DKA for glycemic control, will transition to basal insulin when glucose is less than 180 and anion gap is closed  - Will obtain endocrine consult    Disposition:  - Full code  - Will remain in SICU    Helen Chandler PA-C     [id number]

## 2019-11-04 NOTE — CONSULT NOTE ADULT - PROBLEM SELECTOR RECOMMENDATION 3
- complicated by E. coli bacteremia, patient on Zosyn  - continued treatment/workup per urology/SICU - okay to hold BP meds in setting of acute hypotension and active infection  - will likely need BP meds once resolves, goal SBP < 140, hold for SBP <90

## 2019-11-04 NOTE — CONSULT NOTE ADULT - ATTENDING COMMENTS
1. Septic shock secondary to R pyelo s/p perc nephrostomy:  - Broad-spectrum antibiotics  - Blood and urine cultures  - Arterial line placed in SICU  - Will obtain POC ultrasound to assess volume status (pt is likely hypovolemic given sepsis and HHS/DKA); if patient hypovolemic, will reassess blood pressure to determine need for continued pressors before placing central line    2. JEFF:  - Improving  - Likely secondary to combination septic shock and HHS/DKA with hypovolemia  - BMP q4 as below  - Cannot use urine output as measure of perfusion as may be increased due to osmotic diuresis from hyperglycemia as well as post-obstructive diuresis  - Avoid nephrotoxic medications    3. HHS versus DKA:  - Patient with poorly controlled DM type 2 (not on long-term insulin) presenting with hyperglycemia (initial serum glucose 661), with moderate urine ketones  - Will check plasma ketones and osmolarity  - Replete potassium BEFORE starting insulin gtt, as most recent K 3.2 (hyperkalemia), which will worsen with insulin infusion.   - Q4 BMP to monitor sodium and potassium, hourly POC glucose  - Once glucose <200, add dextrose to fluids  - Endocrine consultation
Patient seen and examined. Agree with note as above with addendum: patient with nearly 20 years of diabetes treated with metformin which she gets from her daughter. She lives in [place] but has a green card and would like to stay here in the [facility]. She currently has medicaid community plan (confirmed by SW), will have to see if they cover the insulin pen. Used Tamil  #[id number] Devyn to speak with her. She understands that it is the natural course of DM to require insulin after 10-15 years.   Ching Lyn MD  Pager: [phone]  Nights and Weekends: [phone]

## 2019-11-04 NOTE — PROGRESS NOTE ADULT - SUBJECTIVE AND OBJECTIVE BOX
UROLOGY DAILY PROGRESS NOTE:     Subjective: Patient seen and examined at bedside. STill on pressors, insulin gtt.  States feeling better.       Objective:  Vital signs  T(F): , Max: 100.7 (11-03-19 @ 13:37)  HR: 86 (11-04-19 @ 07:00)  BP: 91/56 (11-04-19 @ 02:15)  SpO2: 95% (11-04-19 @ 07:00)  Wt(kg): --    I&O's Summary    03 Nov 2019 07:01  -  04 Nov 2019 07:00  --------------------------------------------------------  IN: 4548.9 mL / OUT: 1340 mL / NET: 3208.9 mL    I&O's Detail    03 Nov 2019 07:01  -  04 Nov 2019 07:00  --------------------------------------------------------  IN:    insulin regular Infusion: 50 mL    IV PiggyBack: 1450 mL    Lactated Ringers IV Bolus: 1000 mL    lactated ringers.: 1625 mL    norepinephrine Infusion: 15 mL    phenylephrine   Infusion: 108.9 mL    Solution: 300 mL  Total IN: 4548.9 mL    OUT:    Indwelling Catheter - Urethral: 1240 mL    T-Tube: 100 mL  Total OUT: 1340 mL    Total NET: 3208.9 mL          Gen: NAD  Pulm: No respiratory distress, no subcostal retractions  CV: RRR, no JVD  Abd: Soft, NT, ND  Back: R NT: draining clear urine, dressing clean and intact  : Garcia- clear urine    Labs:  11-04  12.22 / 26.8  /0.96   11-04  15.04 / 27.9  /1.00                           9.1    12.22 )-----------( 149      ( 04 Nov 2019 05:30 )             26.8     11-04    135  |  107  |  10  ----------------------------<  260<H>  3.7   |  20<L>  |  0.96    Ca    7.6<L>      04 Nov 2019 05:30  Phos  3.1     11-04  Mg     2.2     11-04    TPro  5.9<L>  /  Alb  3.0<L>  /  TBili  1.0  /  DBili  x   /  AST  20  /  ALT  15  /  AlkPhos  80  11-03    PT/INR - ( 04 Nov 2019 01:16 )   PT: 15.7 sec;   INR: 1.36 ratio         PTT - ( 04 Nov 2019 01:16 )  PTT:25.7 sec    Urine Cx:  pending       Culture - Blood (11.03.19 @ 15:07)    Gram Stain:   Growth in aerobic and anaerobic bottles: Gram Negative Rods    -  Escherichia coli: Detec    Specimen Source: .Blood    Organism: Blood Culture PCR    Culture Results:   Growth in aerobic and anaerobic bottles: Gram Negative Rods  "Due to technical problems, Proteus sp. will Not be reported as part of  the BCID panel until further notice"  ***Blood Panel PCR results on this specimen are available  approximately 3 hours after the Gram stain result.***  Gram stain, PCR, and/or culture results may not always  correspond due to difference in methodologies.  ************************************************************  This PCR assay was performed using [employer].  The following targets are tested for: Enterococcus,  vancomycin resistant enterococci, Listeria monocytogenes,  coagulase negative staphylococci, S. aureus,  methicillin resistant S. aureus, Streptococcus agalactiae  (Group B), S. pneumoniae, S. pyogenes (Group A),  Acinetobacter baumannii, Enterobacter cloacae, E. coli,  Klebsiella oxytoca, K. pneumoniae, Proteus sp.,  Serratia marcescens, Haemophilus influenzae,  Neisseria meningitidis, Pseudomonas aeruginosa, Candida  albicans, C. glabrata, C krusei, C parapsilosis,  C. tropicalis and the KPC resistance gene.    Organism Identification: Blood Culture PCR    Method Type: PCR

## 2019-11-04 NOTE — PHYSICAL THERAPY INITIAL EVALUATION ADULT - GENERAL OBSERVATIONS, REHAB EVAL
Patient received supine in bed in 8ICU, NAD, VSS on room air, +ICU monitors. +Left radial janie, +Left UE PIV infusing (insulin gtt), +Right UE PIV infusing, +Right nephrostomy tube, +almonte, +bilateral venodynes

## 2019-11-05 DIAGNOSIS — Z71.89 OTHER SPECIFIED COUNSELING: ICD-10-CM

## 2019-11-05 LAB
-  AMIKACIN: SIGNIFICANT CHANGE UP
-  AMPICILLIN/SULBACTAM: SIGNIFICANT CHANGE UP
-  AMPICILLIN: SIGNIFICANT CHANGE UP
-  AZTREONAM: SIGNIFICANT CHANGE UP
-  CEFAZOLIN: SIGNIFICANT CHANGE UP
-  CEFEPIME: SIGNIFICANT CHANGE UP
-  CEFOXITIN: SIGNIFICANT CHANGE UP
-  CEFTRIAXONE: SIGNIFICANT CHANGE UP
-  CIPROFLOXACIN: SIGNIFICANT CHANGE UP
-  ERTAPENEM: SIGNIFICANT CHANGE UP
-  GENTAMICIN: SIGNIFICANT CHANGE UP
-  IMIPENEM: SIGNIFICANT CHANGE UP
-  LEVOFLOXACIN: SIGNIFICANT CHANGE UP
-  MEROPENEM: SIGNIFICANT CHANGE UP
-  NITROFURANTOIN: SIGNIFICANT CHANGE UP
-  NITROFURANTOIN: SIGNIFICANT CHANGE UP
-  PIPERACILLIN/TAZOBACTAM: SIGNIFICANT CHANGE UP
-  TIGECYCLINE: SIGNIFICANT CHANGE UP
-  TIGECYCLINE: SIGNIFICANT CHANGE UP
-  TOBRAMYCIN: SIGNIFICANT CHANGE UP
-  TRIMETHOPRIM/SULFAMETHOXAZOLE: SIGNIFICANT CHANGE UP
ANION GAP SERPL CALC-SCNC: 10 MMOL/L — SIGNIFICANT CHANGE UP (ref 5–17)
BUN SERPL-MCNC: 8 MG/DL — SIGNIFICANT CHANGE UP (ref 7–23)
CALCIUM SERPL-MCNC: 7.7 MG/DL — LOW (ref 8.4–10.5)
CHLORIDE SERPL-SCNC: 108 MMOL/L — SIGNIFICANT CHANGE UP (ref 96–108)
CO2 SERPL-SCNC: 22 MMOL/L — SIGNIFICANT CHANGE UP (ref 22–31)
CREAT SERPL-MCNC: 0.9 MG/DL — SIGNIFICANT CHANGE UP (ref 0.5–1.3)
CULTURE RESULTS: SIGNIFICANT CHANGE UP
GLUCOSE BLDC GLUCOMTR-MCNC: 109 MG/DL — HIGH (ref 70–99)
GLUCOSE BLDC GLUCOMTR-MCNC: 113 MG/DL — HIGH (ref 70–99)
GLUCOSE BLDC GLUCOMTR-MCNC: 123 MG/DL — HIGH (ref 70–99)
GLUCOSE BLDC GLUCOMTR-MCNC: 131 MG/DL — HIGH (ref 70–99)
GLUCOSE BLDC GLUCOMTR-MCNC: 143 MG/DL — HIGH (ref 70–99)
GLUCOSE BLDC GLUCOMTR-MCNC: 151 MG/DL — HIGH (ref 70–99)
GLUCOSE BLDC GLUCOMTR-MCNC: 176 MG/DL — HIGH (ref 70–99)
GLUCOSE BLDC GLUCOMTR-MCNC: 186 MG/DL — HIGH (ref 70–99)
GLUCOSE BLDC GLUCOMTR-MCNC: 226 MG/DL — HIGH (ref 70–99)
GLUCOSE BLDC GLUCOMTR-MCNC: 232 MG/DL — HIGH (ref 70–99)
GLUCOSE BLDC GLUCOMTR-MCNC: 273 MG/DL — HIGH (ref 70–99)
GLUCOSE BLDC GLUCOMTR-MCNC: 97 MG/DL — SIGNIFICANT CHANGE UP (ref 70–99)
GLUCOSE SERPL-MCNC: 246 MG/DL — HIGH (ref 70–99)
HCT VFR BLD CALC: 28.8 % — LOW (ref 34.5–45)
HGB BLD-MCNC: 9.6 G/DL — LOW (ref 11.5–15.5)
MAGNESIUM SERPL-MCNC: 2.2 MG/DL — SIGNIFICANT CHANGE UP (ref 1.6–2.6)
MCHC RBC-ENTMCNC: 29.1 PG — SIGNIFICANT CHANGE UP (ref 27–34)
MCHC RBC-ENTMCNC: 33.3 GM/DL — SIGNIFICANT CHANGE UP (ref 32–36)
MCV RBC AUTO: 87.3 FL — SIGNIFICANT CHANGE UP (ref 80–100)
METHOD TYPE: SIGNIFICANT CHANGE UP
NRBC # BLD: 0 /100 WBCS — SIGNIFICANT CHANGE UP (ref 0–0)
ORGANISM # SPEC MICROSCOPIC CNT: SIGNIFICANT CHANGE UP
PHOSPHATE SERPL-MCNC: 2.8 MG/DL — SIGNIFICANT CHANGE UP (ref 2.5–4.5)
PLATELET # BLD AUTO: 168 K/UL — SIGNIFICANT CHANGE UP (ref 150–400)
POTASSIUM SERPL-MCNC: 3.7 MMOL/L — SIGNIFICANT CHANGE UP (ref 3.5–5.3)
POTASSIUM SERPL-SCNC: 3.7 MMOL/L — SIGNIFICANT CHANGE UP (ref 3.5–5.3)
RBC # BLD: 3.3 M/UL — LOW (ref 3.8–5.2)
RBC # FLD: 13.3 % — SIGNIFICANT CHANGE UP (ref 10.3–14.5)
SODIUM SERPL-SCNC: 140 MMOL/L — SIGNIFICANT CHANGE UP (ref 135–145)
SPECIMEN SOURCE: SIGNIFICANT CHANGE UP
WBC # BLD: 11.33 K/UL — HIGH (ref 3.8–10.5)
WBC # FLD AUTO: 11.33 K/UL — HIGH (ref 3.8–10.5)

## 2019-11-05 PROCEDURE — 99231 SBSQ HOSP IP/OBS SF/LOW 25: CPT

## 2019-11-05 PROCEDURE — 99232 SBSQ HOSP IP/OBS MODERATE 35: CPT

## 2019-11-05 PROCEDURE — 99232 SBSQ HOSP IP/OBS MODERATE 35: CPT | Mod: GC

## 2019-11-05 RX ORDER — HUMAN INSULIN 100 [IU]/ML
14 INJECTION, SUSPENSION SUBCUTANEOUS ONCE
Refills: 0 | Status: DISCONTINUED | OUTPATIENT
Start: 2019-11-05 | End: 2019-11-05

## 2019-11-05 RX ORDER — HUMAN INSULIN 100 [IU]/ML
18 INJECTION, SUSPENSION SUBCUTANEOUS ONCE
Refills: 0 | Status: COMPLETED | OUTPATIENT
Start: 2019-11-05 | End: 2019-11-05

## 2019-11-05 RX ORDER — INSULIN LISPRO 100/ML
VIAL (ML) SUBCUTANEOUS
Refills: 0 | Status: DISCONTINUED | OUTPATIENT
Start: 2019-11-05 | End: 2019-11-08

## 2019-11-05 RX ORDER — INSULIN GLARGINE 100 [IU]/ML
38 INJECTION, SOLUTION SUBCUTANEOUS AT BEDTIME
Refills: 0 | Status: DISCONTINUED | OUTPATIENT
Start: 2019-11-05 | End: 2019-11-05

## 2019-11-05 RX ORDER — INSULIN LISPRO 100/ML
12 VIAL (ML) SUBCUTANEOUS
Refills: 0 | Status: DISCONTINUED | OUTPATIENT
Start: 2019-11-05 | End: 2019-11-06

## 2019-11-05 RX ORDER — POTASSIUM PHOSPHATE, MONOBASIC POTASSIUM PHOSPHATE, DIBASIC 236; 224 MG/ML; MG/ML
15 INJECTION, SOLUTION INTRAVENOUS ONCE
Refills: 0 | Status: COMPLETED | OUTPATIENT
Start: 2019-11-05 | End: 2019-11-05

## 2019-11-05 RX ORDER — INSULIN LISPRO 100/ML
VIAL (ML) SUBCUTANEOUS AT BEDTIME
Refills: 0 | Status: DISCONTINUED | OUTPATIENT
Start: 2019-11-05 | End: 2019-11-08

## 2019-11-05 RX ORDER — POTASSIUM PHOSPHATE, MONOBASIC POTASSIUM PHOSPHATE, DIBASIC 236; 224 MG/ML; MG/ML
15 INJECTION, SOLUTION INTRAVENOUS ONCE
Refills: 0 | Status: DISCONTINUED | OUTPATIENT
Start: 2019-11-05 | End: 2019-11-05

## 2019-11-05 RX ORDER — POTASSIUM CHLORIDE 20 MEQ
20 PACKET (EA) ORAL
Refills: 0 | Status: COMPLETED | OUTPATIENT
Start: 2019-11-05 | End: 2019-11-05

## 2019-11-05 RX ORDER — INSULIN LISPRO 100/ML
8 VIAL (ML) SUBCUTANEOUS
Refills: 0 | Status: DISCONTINUED | OUTPATIENT
Start: 2019-11-05 | End: 2019-11-05

## 2019-11-05 RX ORDER — INSULIN LISPRO 100/ML
16 VIAL (ML) SUBCUTANEOUS
Refills: 0 | Status: DISCONTINUED | OUTPATIENT
Start: 2019-11-05 | End: 2019-11-05

## 2019-11-05 RX ORDER — INSULIN GLARGINE 100 [IU]/ML
35 INJECTION, SOLUTION SUBCUTANEOUS AT BEDTIME
Refills: 0 | Status: DISCONTINUED | OUTPATIENT
Start: 2019-11-05 | End: 2019-11-06

## 2019-11-05 RX ORDER — INSULIN LISPRO 100/ML
12 VIAL (ML) SUBCUTANEOUS
Refills: 0 | Status: DISCONTINUED | OUTPATIENT
Start: 2019-11-05 | End: 2019-11-05

## 2019-11-05 RX ADMIN — Medication 4: at 12:02

## 2019-11-05 RX ADMIN — Medication 12 UNIT(S): at 08:44

## 2019-11-05 RX ADMIN — PIPERACILLIN AND TAZOBACTAM 25 GRAM(S): 4; .5 INJECTION, POWDER, LYOPHILIZED, FOR SOLUTION INTRAVENOUS at 17:35

## 2019-11-05 RX ADMIN — Medication 650 MILLIGRAM(S): at 07:15

## 2019-11-05 RX ADMIN — Medication 1 TABLET(S): at 02:07

## 2019-11-05 RX ADMIN — ATORVASTATIN CALCIUM 10 MILLIGRAM(S): 80 TABLET, FILM COATED ORAL at 21:05

## 2019-11-05 RX ADMIN — Medication 2: at 17:34

## 2019-11-05 RX ADMIN — Medication 12 UNIT(S): at 17:34

## 2019-11-05 RX ADMIN — Medication 650 MILLIGRAM(S): at 08:00

## 2019-11-05 RX ADMIN — HUMAN INSULIN 18 UNIT(S): 100 INJECTION, SUSPENSION SUBCUTANEOUS at 08:45

## 2019-11-05 RX ADMIN — Medication 1 TABLET(S): at 14:02

## 2019-11-05 RX ADMIN — Medication 1 TABLET(S): at 08:47

## 2019-11-05 RX ADMIN — Medication 20 MILLIEQUIVALENT(S): at 02:07

## 2019-11-05 RX ADMIN — Medication 2: at 21:05

## 2019-11-05 RX ADMIN — Medication 12 UNIT(S): at 12:02

## 2019-11-05 RX ADMIN — PIPERACILLIN AND TAZOBACTAM 25 GRAM(S): 4; .5 INJECTION, POWDER, LYOPHILIZED, FOR SOLUTION INTRAVENOUS at 10:00

## 2019-11-05 RX ADMIN — CHLORHEXIDINE GLUCONATE 1 APPLICATION(S): 213 SOLUTION TOPICAL at 06:01

## 2019-11-05 RX ADMIN — INSULIN GLARGINE 35 UNIT(S): 100 INJECTION, SOLUTION SUBCUTANEOUS at 21:05

## 2019-11-05 RX ADMIN — PIPERACILLIN AND TAZOBACTAM 25 GRAM(S): 4; .5 INJECTION, POWDER, LYOPHILIZED, FOR SOLUTION INTRAVENOUS at 02:07

## 2019-11-05 RX ADMIN — POTASSIUM PHOSPHATE, MONOBASIC POTASSIUM PHOSPHATE, DIBASIC 62.5 MILLIMOLE(S): 236; 224 INJECTION, SOLUTION INTRAVENOUS at 06:01

## 2019-11-05 RX ADMIN — Medication 1 TABLET(S): at 21:05

## 2019-11-05 RX ADMIN — ENOXAPARIN SODIUM 40 MILLIGRAM(S): 100 INJECTION SUBCUTANEOUS at 12:02

## 2019-11-05 RX ADMIN — Medication 20 MILLIEQUIVALENT(S): at 04:10

## 2019-11-05 NOTE — DIETITIAN INITIAL EVALUATION ADULT. - FACTORS AFF FOOD INTAKE
Vegetarian LactoOvo diet per pt preference. Pt interview pending; RD will assess po intake at meal rounds/[employer]ian/ethnic/cultural/personal food preferences Syn[employer]/ethnic/cultural/personal food preferences/Vegetarian LactoOvo diet ordered; pt also eats fish. Pt observed eating lunch with good appetite, but prefers spicy Cuban food.

## 2019-11-05 NOTE — PROGRESS NOTE ADULT - SUBJECTIVE AND OBJECTIVE BOX
UROLOGY PA PROGRESS NOTE:     Subjective:  no acute events overnight. pt without complaints.     Objective:  Vital signs  T(F): , Max: 98.6 (11-04-19 @ 07:00)  HR: 90 (11-05-19 @ 06:00)  BP: 102/51 (11-05-19 @ 06:00)  SpO2: 99% (11-05-19 @ 06:00)  Wt(kg): --    Output     11-03 @ 07:01  -  11-04 @ 07:00  --------------------------------------------------------  IN: 4548.9 mL / OUT: 1340 mL / NET: 3208.9 mL    11-04 @ 07:01  -  11-05 @ 06:55  --------------------------------------------------------  IN: 2626.5 mL / OUT: 2755 mL / NET: -128.5 mL        Physical Exam:  Gen: NAD  Abd: soft, NT/ND, right NT draining clear   : +almonte draining clear     Labs:  11-05  11.33 / 28.8  /0.90   11-04  x     / x     /0.87                           9.6    11.33 )-----------( 168      ( 05 Nov 2019 00:19 )             28.8     11-05    140  |  108  |  8   ----------------------------<  246<H>  3.7   |  22  |  0.90    Ca    7.7<L>      05 Nov 2019 00:19  Phos  2.8     11-05  Mg     2.2     11-05    TPro  5.9<L>  /  Alb  3.0<L>  /  TBili  1.0  /  DBili  x   /  AST  20  /  ALT  15  /  AlkPhos  80  11-03    PT/INR - ( 04 Nov 2019 01:16 )   PT: 15.7 sec;   INR: 1.36 ratio         PTT - ( 04 Nov 2019 01:16 )  PTT:25.7 sec      Urine Cx:    Culture - Blood (collected 03 Nov 2019 22:53)  Source: .Blood Blood-Venous  Preliminary Report (04 Nov 2019 23:01):    No growth to date.    Culture - Blood (collected 03 Nov 2019 22:53)  Source: .Blood Blood-Venous  Preliminary Report (04 Nov 2019 23:01):    No growth to date.    Culture - Urine (collected 03 Nov 2019 21:55)  Source: .Urine Nephrostomy - Right  Preliminary Report (04 Nov 2019 17:54):    50,000 - 99,000 CFU/mL Gram Negative Rods    Culture - Blood (collected 03 Nov 2019 15:07)  Source: .Blood  Gram Stain (03 Nov 2019 23:11):    Growth in aerobic and anaerobic bottles: Gram Negative Rods  Preliminary Report (04 Nov 2019 17:11):    Growth in aerobic and anaerobic bottles: Escherichia coli    "Due to technical problems, Proteus sp. will Not be reported as part of    the BCID panel until further notice"    ***Blood Panel PCR results on this specimen are available    approximately 3 hours after the Gram stain result.***    Gram stain, PCR, and/or culture results may not always    correspond due to difference in methodologies.    ************************************************************    This PCR assay was performed using [employer].    The following targets are tested for: Enterococcus,    vancomycin resistant enterococci, Listeria monocytogenes,    coagulase negative staphylococci, S. aureus,    methicillin resistant S. aureus, Streptococcus agalactiae    (Group B), S. pneumoniae, S.pyogenes (Group A),    Acinetobacter baumannii, Enterobacter cloacae, E. coli,    Klebsiella oxytoca, K. pneumoniae, Proteus sp.,    Serratia marcescens, Haemophilus influenzae,    Neisseria meningitidis, Pseudomonas aeruginosa, Candida    albicans, C. glabrata, C krusei, C parapsilosis,    C. tropicalis and the KPC resistance gene.  Organism: Blood Culture PCR (03 Nov 2019 23:31)  Organism: Blood Culture PCR (03 Nov 2019 23:31)    Culture - Blood (collected 03 Nov 2019 15:07)  Source: .Blood  Gram Stain (03 Nov 2019 23:09):    Growth in anaerobic bottle: Gram Negative Rods    Growth in aerobic bottle: Gram Negative Rods  Preliminary Report (04 Nov 2019 17:14):    Growth in aerobic and anaerobic bottles: Escherichia coli    See previous culture [id number]    Culture - Urine (collected 03 Nov 2019 15:02)  Source: .Urine  Preliminary Report (04 Nov 2019 10:44):    >100,000 CFU/ml Gram Negative Rods

## 2019-11-05 NOTE — DIETITIAN INITIAL EVALUATION ADULT. - OTHER INFO
INFORMATION PTA  Diet PTA: Pt interview pending.   Nutrition status PTA: Per Endocrine consult, pt with poorly controlled DM2, managed only with Metformin 500mg bid. Pt reported typical fingersticks in the 200's which pt considered "well controlled".  Nutrition Supplements PTA: none noted  Food Allergies: NKFA  Weight History PTA: unavailable    INFORMATION THIS ADMISSION  Last BM: 11/4 x 3  Other  Information: Pt admitted to OSH with BG >600mg/dL, now managed with Humalog 12 units tid, NPH 18 units, and insulin drip (restarted after hyperglycemia)  Therapeutic Diet Education Provided: Pt interview pending  Education Handouts Provided: INFORMATION PTA  Diet PTA: Pt eats traditional  food, following a [place] [employer]arian diet. Daily intake includes 1 serving of "diabetic rice", and whole grain bread. Pt drinks no sugary beverages. Family reports adequate protein intake from egg, fish, nuts, yogurt and legumes.   Nutrition status PTA: Per Endocrine consult, pt with poorly controlled DM2, managed only with Metformin 500mg bid since 2004. Son-in-law helps pt check fingersticks, reports pt has been forgetting to take her Metformin but family was not aware.  Nutrition Supplements PTA: none noted  Food Allergies: NKFA  Weight History PTA: stable    INFORMATION THIS ADMISSION  Last BM: 11/4 x 3  Other  Information: Pt admitted to OSH with BG >600mg/dL, now managed with Humalog 12 units tid, NPH 18 units, and insulin drip (restarted after hyperglycemia)  Therapeutic Diet Education Provided: Consistent Carbohydrate diet education provided, with emphasis on maintaining adequate protein on a vegetarian diet, while maintaining appropriate carbohydrate portions. Family was receptive and expressed understanding.  Education Handouts Provided: Carbohydrate Counting for Vegetarians with Diabetes

## 2019-11-05 NOTE — PROGRESS NOTE ADULT - SUBJECTIVE AND OBJECTIVE BOX
HISTORY:  74 year old female with a PMHx not limited to DM type II and HLD (rest unknown) [facility] with glucose in the 600s, nausea/vomiting, and right flank pain. She was only taking metformin that was prescribed in [place] and has not been following up with doctors in the [place] due to lack of insurance. Labs were significant for a leukocytosis. She was cultured, started on antibiotics, given 3 L of crystalloid, and admitted to medicine for glycemic control. CT abdomen/pelvis was obtained for her right flank pain, which revealed right emphysematous pyelonephritis. Patient was subsequently transferred to [facility] for emergent right nephrostomy tube placement with IR. She became hypotensive and was given an additional liter of crystalloid before being started on vasopressor support. Patient was admitted to SICU for hemodynamic monitoring and glycemic control with an insulin infusion.    24 HOUR EVENTS:  - Weaned off vasopressor support yesterday morning at 8 AM  - Advanced to regular diet  - Changed LR at 125 mL/hr to LR w/ 20 mEq KCl at 50 mL/hr for hypokalemia  - Discontinued left radial arterial line  - Given Lantus 25 units in the morning and added Humalog 8 units pre-meal, was transitioned off insulin gtt but patient became hyperglycemic w/ glucose of 317 so restarted insulin gtt overnight HISTORY:  74 year old female with a PMHx not limited to DM type II and HLD (rest unknown) who presented initially to [facility] on 11/3 with glucose in the 600s, nausea/vomiting, and right flank pain. She was only taking metformin that was prescribed in [place] and has not been following up with doctors in the [place] due to lack of insurance. Labs were significant for a leukocytosis. She was cultured, started on antibiotics, given 3 L of crystalloid, and admitted to medicine for glycemic control. CT abdomen/pelvis was obtained for her right flank pain, which revealed right emphysematous pyelonephritis. Patient was subsequently transferred to [facility] for emergent right nephrostomy tube placement with IR. She became hypotensive and was given an additional liter of crystalloid before being started on vasopressor support. Patient was admitted to SICU for hemodynamic monitoring and glycemic control with an insulin infusion.    24 HOUR EVENTS:  - Weaned off vasopressor support yesterday morning at 8 AM  - Advanced to regular diet  - Changed LR at 125 mL/hr to LR w/ 20 mEq KCl at 50 mL/hr for hypokalemia  - Discontinued left radial arterial line  - Given Lantus 25 units in the morning and added Humalog 8 units pre-meal, was transitioned off insulin gtt but patient became hyperglycemic w/ glucose of 317 so restarted insulin gtt overnight    SUBJECTIVE/ROS:  [x] A ten-point review of systems was otherwise negative except as noted.  [ ] Due to altered mental status/intubation, subjective information were not able to be obtained from the patient. History was obtained, to the extent possible, from review of the chart and collateral sources of information.    NEURO  Exam: awake, alert, oriented x4, no acute distress, no focal deficits  Meds: acetaminophen   Tablet .. 650 milliGRAM(s) Oral every 8 hours PRN Mild Pain (1 - 3)  [x] Adequacy of sedation and pain control has been assessed and adjusted    RESPIRATORY  RR: 21 (11-05-19 @ 01:00) (16 - 37)  SpO2: 97% (11-05-19 @ 01:00) (93% - 100%)  Exam: clear to auscultation bilaterally  Mechanical Ventilation: no  [N/A] Extubation Readiness Assessed  Meds: none    CARDIOVASCULAR  HR: 91 (11-05-19 @ 01:00) (84 - 109)  BP: 90/56 (11-05-19 @ 01:00) (90/53 - 119/68)  BP(mean): 68 (11-05-19 @ 01:00) (66 - 90)  ABP: 97/78 (11-04-19 @ 11:45) (72/59 - 272/272)  ABP(mean): 87 (11-04-19 @ 11:45) (66 - 272)  Exam: regular rate and rhythm, S1S2  Cardiac Rhythm: sinus  Perfusion    [x]Adequate    [ ]Inadequate  Mentation   [x]Normal       [ ]Reduced  Extremities  [X]Warm         [ ]Cool  Volume Status [ ]Hypervolemic [x]Euvolemic [ ]Hypovolemic  Meds: none    GI/NUTRITION  Exam: soft, nontender, nondistended, right nephrostomy tube with pink lemonade-colored urine  Diet: NPO except medications  Meds:  - polyethylene glycol 3350 17 Gram(s) Oral every 24 hours PRN Constipation  - senna 2 Tablet(s) Oral at bedtime    GENITOURINARY  I&O's Detail  11-04 @ 07:01  -  11-05 @ 02:12  --------------------------------------------------------  IN:    insulin regular Infusion: 3 mL    insulin regular Infusion: 17 mL    IV PiggyBack: 350 mL    lactated ringers: 1050 mL    lactated ringers.: 125 mL    Oral Fluid: 300 mL    phenylephrine   Infusion: 3 mL    Solution: 300 mL  Total IN: 2148 mL    OUT:    Indwelling Catheter - Urethral: 2070 mL    Nephrostomy Tube: 100 mL  Total OUT: 2170 mL    Total NET: -22 mL    140  |  108  |  8   ----------------------------<  246<H>  3.7   |  22  |  0.90    Ca    7.7<L>      05 Nov 2019 00:19  Phos  2.8  Mg     2.2    [x] Garcia catheter, indication: urine output monitoring in the critically ill  Meds: lactated ringers with potassium chloride additive 20 milliEquivalent(s) infuse at 50 mL/hr    HEMATOLOGIC  Meds: enoxaparin Injectable 40 milliGRAM(s) SubCutaneous daily  [x] VTE Prophylaxis                        9.6    11.33 )-----------( 168      ( 05 Nov 2019 00:19 )             28.8     INFECTIOUS DISEASES  T(C): 37 (11-04-19 @ 23:00), Max: 37.1 (11-04-19 @ 03:00)  WBC Count:  - 11.33 K/uL (11-05 @ 00:19)  - 12.22 K/uL (11-04 @ 05:30)  Recent Cultures:  - Specimen Source: .Blood Blood-Venous, 11-03 @ 22:53  Results: No growth to date.  Gram Stain: --  Organism: --  - Specimen Source: .Urine Nephrostomy - Right, 11-03 @ 21:55  Results: 50,000 - 99,000 CFU/mL Gram Negative Rods  Gram Stain: --  Organism: --  - Specimen Source: .Blood, 11-03 @ 15:07  Results: Growth in aerobic and anaerobic bottles: Escherichia coli  Gram Stain: Growth in aerobic and anaerobic bottles: Gram Negative Rods  Organism: Blood Culture PCR Escherichia coli  - Specimen Source: .Urine, 11-03 @ 15:02  Results: >100,000 CFU/ml Gram Negative Rods  Gram Stain: --  Organism: --  Meds: piperacillin/tazobactam IVPB.. 3.375 Gram(s) IV Intermittent every 8 hours    ENDOCRINE  Capillary Blood Glucose:  - 151 mg/dL (05 Nov 2019 02:06)  - 186 mg/dL (05 Nov 2019 01:09)  - 232 mg/dL (05 Nov 2019 00:03)  - 238 mg/dL (04 Nov 2019 23:01)  - 294 mg/dL (04 Nov 2019 21:58)  - 317 mg/dL (04 Nov 2019 21:06)  - 275 mg/dL (04 Nov 2019 15:47)  - 233 mg/dL (04 Nov 2019 11:58)  - 232 mg/dL (04 Nov 2019 11:04)  - 221 mg/dL (04 Nov 2019 10:13)  - 149 mg/dL (04 Nov 2019 09:13)  - 162 mg/dL (04 Nov 2019 07:57)  - 194 mg/dL (04 Nov 2019 06:54)  - 231 mg/dL (04 Nov 2019 06:14)  - 260 mg/dL (04 Nov 2019 05:08)  - 275 mg/dL (04 Nov 2019 04:01)  - 298 mg/dL (04 Nov 2019 03:03)  Meds:  - atorvastatin 10 milliGRAM(s) Oral at bedtime  - insulin regular Infusion 4 Unit(s)/Hr IV Continuous <Continuous>    ACCESS DEVICES:  [x] Peripheral IV  [ ] Central Venous Line	[ ] R	[ ] L	[ ] IJ	[ ] Fem	[ ] SC	Placed:   [ ] Arterial Line		[ ] R	[ ] L	[ ] Fem	[ ] Rad	[ ] Ax	Placed:   [ ] PICC:					[ ] Mediport  [x] Urinary Catheter, Date Placed: 11/3  [x] Necessity of urinary, arterial, and venous catheters discussed    OTHER MEDICATIONS: chlorhexidine 2% Cloths 1 Application(s) Topical <User Schedule>    CODE STATUS: Full code    IMAGING:

## 2019-11-05 NOTE — PROGRESS NOTE ADULT - SUBJECTIVE AND OBJECTIVE BOX
Interventional Radiology    S/P Right percutaneous nephrostomy tube placement, POD # 2.      Pt speaks very limited english.  Pt denies N/V, or abdominal pain, or flank pain.  She stated that she is eating her breakfast slowly.    Pt was weaned off of pressors on 11/4/19.  WBC count continues to trend down.     Vital Signs Last 24 Hrs  T(C): 36.8 (05 Nov 2019 07:00), Max: 37 (04 Nov 2019 11:00)  T(F): 98.2 (05 Nov 2019 07:00), Max: 98.6 (04 Nov 2019 11:00)  HR: 99 (05 Nov 2019 09:00) (84 - 109)  BP: 115/57 (05 Nov 2019 09:00) (90/56 - 119/68)  BP(mean): 79 (05 Nov 2019 09:00) (66 - 90)  RR: 29 (05 Nov 2019 09:00) (16 - 41)  SpO2: 100% (05 Nov 2019 09:00) (94% - 100%)    General Appearance: NAD, seen sitting in chair in room     Procedure Site (Right flank): dressing c/d/i. catheter intact draining yellow urine to leg bag    I&O's Detail    Right Nephrostomy Tube: 11/4/19 7AM 100 mL, 11/5/19 7AM 160 mL    LABS:                        9.6    11.33 )-----------( 168      ( 05 Nov 2019 00:19 )             28.8     11-05    140  |  108  |  8   ----------------------------<  246<H>  3.7   |  22  |  0.90    Ca    7.7<L>      05 Nov 2019 00:19  Phos  2.8     11-05  Mg     2.2     11-05    TPro  5.9<L>  /  Alb  3.0<L>  /  TBili  1.0  /  DBili  x   /  AST  20  /  ALT  15  /  AlkPhos  80  11-03    PT/INR - ( 04 Nov 2019 01:16 )   PT: 15.7 sec;   INR: 1.36 ratio    PTT - ( 04 Nov 2019 01:16 )  PTT:25.7 sec    Culture - Urine (11.03.19 @ 21:55)    Specimen Source: .Urine Nephrostomy - Right    Culture Results:   50,000 - 99,000 CFU/mL Gram Negative Rods        A/P  74y Female with E. Coli Bacteremia, Right emphysematous pyelonephritis S/P Right perc nephrostomy (PCN)    Continue global medical management as per SICU team/primary team.    Forward flush the catheter daily as ordered, maintain dressing over the site. monitor and record daily outputs from R PCN.    Will discuss with IR attending.    SON Dumont  [facility] [id number]  Ext [phone]

## 2019-11-05 NOTE — PROGRESS NOTE ADULT - PROBLEM SELECTOR PLAN 3
- okay to hold BP meds in setting of acute hypotension and active infection  - will likely need BP meds once resolves, goal SBP < 140, hold for SBP <90

## 2019-11-05 NOTE — PROGRESS NOTE ADULT - SUBJECTIVE AND OBJECTIVE BOX
Chief Complaint: right flank pain, R emphysematous pyelitis  Consult Reason: diabetes management    Interval History:  Patient noted to have rise in glucose overnight, insulin infusion restarted, stopped this morning and patient given 18 units NPH this morning with plan to start lantus tonight. Patient seen and examined this morning at bedside,  service used to speak with patient ([place] , [place] [id number]). Patient states that she feels okay this morning with no acute complaints. Has some pain at nephrostomy tube surgery site when she moves around, but very minimal and she denies other symptoms. She specifically denies headache, lightheadedness, fever/chills, chest pain, abdominal pain, nausea/vomiting, diarrhea/constipation, polyuria/polydipsia, dysuria.     MEDICATIONS  (STANDING):  atorvastatin 10 milliGRAM(s) Oral at bedtime  chlorhexidine 2% Cloths 1 Application(s) Topical <User Schedule>  enoxaparin Injectable 40 milliGRAM(s) SubCutaneous daily  insulin lispro (HumaLOG) corrective regimen sliding scale   SubCutaneous three times a day before meals  insulin lispro (HumaLOG) corrective regimen sliding scale   SubCutaneous at bedtime  insulin lispro Injectable (HumaLOG) 16 Unit(s) SubCutaneous three times a day before meals  piperacillin/tazobactam IVPB.. 3.375 Gram(s) IV Intermittent every 8 hours  potassium acid phosphate/sodium acid phosphate tablet (K-PHOS No. 2) 1 Tablet(s) Oral four times a day with meals  senna 2 Tablet(s) Oral at bedtime    MEDICATIONS  (PRN):  acetaminophen   Tablet .. 650 milliGRAM(s) Oral every 8 hours PRN Mild Pain (1 - 3)  polyethylene glycol 3350 17 Gram(s) Oral every 24 hours PRN Constipation      Allergies    No Known Allergies    Intolerances  None known        PHYSICAL EXAM:  VITALS: T(C): 36.7 (11-05-19 @ 11:00)  T(F): 98.1 (11-05-19 @ 11:00), Max: 98.6 (11-04-19 @ 23:00)  HR: 91 (11-05-19 @ 13:00) (84 - 109)  BP: 111/59 (11-05-19 @ 13:00) (84/52 - 119/68)  RR:  (16 - 42)  SpO2:  (96% - 100%)  Wt(kg): --    GENERAL: NAD, A+Ox3, sitting in chair comfortably  HEENT:  Atraumatic, Normocephalic  RESPIRATORY: Clear to auscultation bilaterally; No rales, rhonchi, wheezing, or rubs  CARDIOVASCULAR: Regular rate and rhythm; No murmurs; no peripheral edema  GI: Soft, nontender, non distended, normal bowel sounds; righ nephrostomy tube with site C/D/I, clear drainage to leg bag  : almonte in place, clear urine draining  SKIN: Dry, intact, No rashes or lesions    POCT Blood Glucose.: 226 mg/dL (11-05-19 @ 11:57)  POCT Blood Glucose.: 143 mg/dL (11-05-19 @ 08:11)  POCT Blood Glucose.: 113 mg/dL (11-05-19 @ 06:52)  POCT Blood Glucose.: 97 mg/dL (11-05-19 @ 06:03)  POCT Blood Glucose.: 109 mg/dL (11-05-19 @ 05:01)  POCT Blood Glucose.: 123 mg/dL (11-05-19 @ 04:08)  POCT Blood Glucose.: 131 mg/dL (11-05-19 @ 03:00)  POCT Blood Glucose.: 151 mg/dL (11-05-19 @ 02:06)  POCT Blood Glucose.: 186 mg/dL (11-05-19 @ 01:09)  POCT Blood Glucose.: 232 mg/dL (11-05-19 @ 00:03)  POCT Blood Glucose.: 238 mg/dL (11-04-19 @ 23:01)  POCT Blood Glucose.: 294 mg/dL (11-04-19 @ 21:58)  POCT Blood Glucose.: 317 mg/dL (11-04-19 @ 21:06)  POCT Blood Glucose.: 275 mg/dL (11-04-19 @ 15:47)  POCT Blood Glucose.: 233 mg/dL (11-04-19 @ 11:58)  POCT Blood Glucose.: 232 mg/dL (11-04-19 @ 11:04)  POCT Blood Glucose.: 221 mg/dL (11-04-19 @ 10:13)  POCT Blood Glucose.: 149 mg/dL (11-04-19 @ 09:13)  POCT Blood Glucose.: 162 mg/dL (11-04-19 @ 07:57)  POCT Blood Glucose.: 194 mg/dL (11-04-19 @ 06:54)  POCT Blood Glucose.: 231 mg/dL (11-04-19 @ 06:14)  POCT Blood Glucose.: 260 mg/dL (11-04-19 @ 05:08)  POCT Blood Glucose.: 275 mg/dL (11-04-19 @ 04:01)  POCT Blood Glucose.: 298 mg/dL (11-04-19 @ 03:03)  POCT Blood Glucose.: 350 mg/dL (11-04-19 @ 02:12)  POCT Blood Glucose.: 347 mg/dL (11-04-19 @ 01:05)  POCT Blood Glucose.: 316 mg/dL (11-04-19 @ 00:20)  POCT Blood Glucose.: 327 mg/dL (11-03-19 @ 23:21)  POCT Blood Glucose.: 328 mg/dL (11-03-19 @ 22:22)  POCT Blood Glucose.: 490 mg/dL (11-03-19 @ 11:24)  POCT Blood Glucose.: 493 mg/dL (11-03-19 @ 08:29)  POCT Blood Glucose.: 494 mg/dL (11-03-19 @ 07:44)  POCT Blood Glucose.: 475 mg/dL (11-03-19 @ 03:59)  POCT Blood Glucose.: >600 mg/dL (11-03-19 @ 02:23)      11-05    140  |  108  |  8   ----------------------------<  246<H>  3.7   |  22  |  0.90    EGFR if : 74  EGFR if non : 63    Ca    7.7<L>      11-05  Mg     2.2     11-05  Phos  2.8     11-05    TPro  5.9<L>  /  Alb  3.0<L>  /  TBili  1.0  /  DBili  x   /  AST  20  /  ALT  15  /  AlkPhos  80  11-03        Thyroid Function Tests:  11-03 @ 10:42 TSH 0.23 FreeT4 -- T3 -- Anti TPO -- Anti Thyroglobulin Ab -- TSI --      Hemoglobin A1C, Whole Blood: 10.6 % <H> [4.0 - 5.6] (11-03-19 @ 14:28) Chief Complaint: right flank pain, R emphysematous pyelitis  Consult Reason: diabetes management    Interval History:  Patient noted to have rise in glucose overnight, insulin infusion restarted, stopped this morning and patient given 18 units NPH this morning with plan to start lantus tonight. Patient seen and examined this morning at bedside,  service used to speak with patient ([place]un , [place] [id number]). Patient states that she feels okay this morning with no acute complaints. Has some pain at nephrostomy tube surgery site when she moves around, but very minimal and she denies other symptoms. She specifically denies headache, lightheadedness, fever/chills, chest pain, abdominal pain, nausea/vomiting, diarrhea/constipation, polyuria/polydipsia, dysuria.     MEDICATIONS  (STANDING):  atorvastatin 10 milliGRAM(s) Oral at bedtime  chlorhexidine 2% Cloths 1 Application(s) Topical <User Schedule>  enoxaparin Injectable 40 milliGRAM(s) SubCutaneous daily  insulin lispro (HumaLOG) corrective regimen sliding scale   SubCutaneous three times a day before meals  insulin lispro (HumaLOG) corrective regimen sliding scale   SubCutaneous at bedtime  insulin lispro Injectable (HumaLOG) 16 Unit(s) SubCutaneous three times a day before meals  piperacillin/tazobactam IVPB.. 3.375 Gram(s) IV Intermittent every 8 hours  potassium acid phosphate/sodium acid phosphate tablet (K-PHOS No. 2) 1 Tablet(s) Oral four times a day with meals  senna 2 Tablet(s) Oral at bedtime    MEDICATIONS  (PRN):  acetaminophen   Tablet .. 650 milliGRAM(s) Oral every 8 hours PRN Mild Pain (1 - 3)  polyethylene glycol 3350 17 Gram(s) Oral every 24 hours PRN Constipation      Allergies    No Known Allergies    Intolerances  None known        PHYSICAL EXAM:  VITALS: T(C): 36.7 (11-05-19 @ 11:00)  T(F): 98.1 (11-05-19 @ 11:00), Max: 98.6 (11-04-19 @ 23:00)  HR: 91 (11-05-19 @ 13:00) (84 - 109)  BP: 111/59 (11-05-19 @ 13:00) (84/52 - 119/68)  RR:  (16 - 42)  SpO2:  (96% - 100%)  Wt(kg): --    GENERAL: NAD, A+Ox3, sitting in chair comfortably  HEENT:  Atraumatic, Normocephalic  RESPIRATORY: Clear to auscultation bilaterally; No rales, rhonchi, wheezing, or rubs  CARDIOVASCULAR: Regular rate and rhythm; No murmurs; no peripheral edema  GI: Soft, nontender, non distended, normal bowel sounds; righ nephrostomy tube with site C/D/I, clear drainage to leg bag      POCT Blood Glucose.: 226 mg/dL (11-05-19 @ 11:57)  POCT Blood Glucose.: 143 mg/dL (11-05-19 @ 08:11)  POCT Blood Glucose.: 113 mg/dL (11-05-19 @ 06:52)  POCT Blood Glucose.: 97 mg/dL (11-05-19 @ 06:03)  POCT Blood Glucose.: 109 mg/dL (11-05-19 @ 05:01)  POCT Blood Glucose.: 123 mg/dL (11-05-19 @ 04:08)  POCT Blood Glucose.: 131 mg/dL (11-05-19 @ 03:00)  POCT Blood Glucose.: 151 mg/dL (11-05-19 @ 02:06)  POCT Blood Glucose.: 186 mg/dL (11-05-19 @ 01:09)  POCT Blood Glucose.: 232 mg/dL (11-05-19 @ 00:03)  POCT Blood Glucose.: 238 mg/dL (11-04-19 @ 23:01)  POCT Blood Glucose.: 294 mg/dL (11-04-19 @ 21:58)  POCT Blood Glucose.: 317 mg/dL (11-04-19 @ 21:06)  POCT Blood Glucose.: 275 mg/dL (11-04-19 @ 15:47)  POCT Blood Glucose.: 233 mg/dL (11-04-19 @ 11:58)  POCT Blood Glucose.: 232 mg/dL (11-04-19 @ 11:04)  POCT Blood Glucose.: 221 mg/dL (11-04-19 @ 10:13)  POCT Blood Glucose.: 149 mg/dL (11-04-19 @ 09:13)  POCT Blood Glucose.: 162 mg/dL (11-04-19 @ 07:57)  POCT Blood Glucose.: 194 mg/dL (11-04-19 @ 06:54)  POCT Blood Glucose.: 231 mg/dL (11-04-19 @ 06:14)  POCT Blood Glucose.: 260 mg/dL (11-04-19 @ 05:08)  POCT Blood Glucose.: 275 mg/dL (11-04-19 @ 04:01)  POCT Blood Glucose.: 298 mg/dL (11-04-19 @ 03:03)  POCT Blood Glucose.: 350 mg/dL (11-04-19 @ 02:12)  POCT Blood Glucose.: 347 mg/dL (11-04-19 @ 01:05)  POCT Blood Glucose.: 316 mg/dL (11-04-19 @ 00:20)  POCT Blood Glucose.: 327 mg/dL (11-03-19 @ 23:21)  POCT Blood Glucose.: 328 mg/dL (11-03-19 @ 22:22)  POCT Blood Glucose.: 490 mg/dL (11-03-19 @ 11:24)  POCT Blood Glucose.: 493 mg/dL (11-03-19 @ 08:29)  POCT Blood Glucose.: 494 mg/dL (11-03-19 @ 07:44)  POCT Blood Glucose.: 475 mg/dL (11-03-19 @ 03:59)  POCT Blood Glucose.: >600 mg/dL (11-03-19 @ 02:23)      11-05    140  |  108  |  8   ----------------------------<  246<H>  3.7   |  22  |  0.90    EGFR if : 74  EGFR if non : 63    Ca    7.7<L>      11-05  Mg     2.2     11-05  Phos  2.8     11-05    TPro  5.9<L>  /  Alb  3.0<L>  /  TBili  1.0  /  DBili  x   /  AST  20  /  ALT  15  /  AlkPhos  80  11-03        Thyroid Function Tests:  11-03 @ 10:42 TSH 0.23       Hemoglobin A1C, Whole Blood: 10.6 % <H> [4.0 - 5.6] (11-03-19 @ 14:28)

## 2019-11-05 NOTE — PROGRESS NOTE ADULT - ASSESSMENT
73 y/o female presenting with HHS vs. DKA, septic shock, E. coli bacteremia, and right emphysematous pyelonephritis s/p right nephrostomy tube placement    PLAN:    Neuro: acute post-procedure pain  - Monitor mental status  - Pain control as needed with acetaminophen (will not give more than 2 grams daily in the setting of cirrhosis)    Resp: no acute issues  - Monitor pulse oximeter  - Out of bed to chair, ambulate as tolerated, and incentive spirometry to prevent atelectasis    CV: septic shock (resolved)  - Monitor vital signs    GI: no acute issues  - Regular diet as tolerated  - Bowel regimen with senna and Miralax    Renal: JEFF on likely CKD, right emphysematous pyelonephritis  - Monitor I&Os  - Monitor electrolytes and replete as necessary  - LR with 20 mEq KCl at 50 mL/hr while being resuscitated for HHS vs. DKA  - Monitor right nephrostomy drain output    Heme: normocytic anemia  - Monitor CBC and coags  - Lovenox for VTE prophylaxis    ID: E. coli bacteremia, right emphysematous pyelonephritis  - Monitor WBC, temperature, and procalcitonin  - Blood cultures from 11/3 collected at [facility] positive for E. coli, blood cultures from 11/4 and urine cultures from 11/3 & 11/4 pending  - Follow up E. coli sensitivities  - Empiric antibiotics with Zosyn    Endo: DM type II, HSS vs. DKA  - Insulin infusion for HSS vs. DKA for glycemic control, will transition to basal insulin when glucose is less than 180 and anion gap is closed  - Will obtain endocrine consult    Disposition:  - Full code  - Will remain in SICU    Helen Chandler PA-C     [id number] 73 y/o female presenting with HHS vs. DKA, septic shock, E. coli bacteremia, and right emphysematous pyelonephritis s/p right nephrostomy tube placement    PLAN:    Neuro: acute post-procedure pain  - Monitor mental status  - Pain control as needed with acetaminophen (will not give more than 2 grams daily in the setting of cirrhosis)    Resp: no acute issues  - Monitor pulse oximeter  - Out of bed to chair, ambulate as tolerated, and incentive spirometry to prevent atelectasis    CV: septic shock (resolved)  - Monitor vital signs    GI: no acute issues  - Regular diet as tolerated  - Bowel regimen with senna and Miralax    Renal: JEFF on likely CKD, right emphysematous pyelonephritis  - Monitor I&Os  - Monitor electrolytes and replete as necessary  - LR with 20 mEq KCl at 50 mL/hr while being resuscitated for HHS vs. DKA  - Monitor right nephrostomy drain output    Heme: normocytic anemia  - Monitor CBC and coags  - Lovenox for VTE prophylaxis    ID: E. coli bacteremia, right emphysematous pyelonephritis  - Monitor WBC, temperature, and procalcitonin  - Blood cultures from 11/3 collected at [facility] positive for E. coli, blood cultures from 11/4 and urine cultures from 11/3 & 11/4 pending  - Follow up E. coli sensitivities  - Empiric antibiotics with Zosyn    Endo: DM type II, HSS vs. DKA  - Insulin infusion for HSS vs. DKA for glycemic control  - Attempted to transition to Lantus 25 units every morning with Humalog 8 units pre-meal but became hyperglycemic requiring insulin infusion overnight, basal insulin requirements recalculated so will give NPH 18 units this morning with plans to transition to Lantus 36 units at bedtime w/ Humalog 12 units pre-meal  - Endocrine recommendations appreciated    Disposition:  - Full code  - Will remain in SICU    Helen Chandler PA-C     [id number]

## 2019-11-05 NOTE — DIETITIAN INITIAL EVALUATION ADULT. - PERTINENT LABORATORY DATA
11-05 @ 00:19: Sodium 140, Potassium 3.7, Chloride 108, Calcium 7.7<L>, Magnesium 2.2, Phosphorus 2.8, BUN 8, Creatinine 0.90, <H>, Hemoglobin 9.6<L>, Hematocrit 28.8<L>  HbA1c 10.6%  POCT Blood Glucose.: 113 mg/dL (05 Nov 2019 06:52)  POCT Blood Glucose.: 97 mg/dL (05 Nov 2019 06:03)  POCT Blood Glucose.: 109 mg/dL (05 Nov 2019 05:01)  POCT Blood Glucose.: 123 mg/dL (05 Nov 2019 04:08)  POCT Blood Glucose.: 131 mg/dL (05 Nov 2019 03:00)  POCT Blood Glucose.: 151 mg/dL (05 Nov 2019 02:06)  POCT Blood Glucose.: 186 mg/dL (05 Nov 2019 01:09)  POCT Blood Glucose.: 232 mg/dL (05 Nov 2019 00:03)

## 2019-11-05 NOTE — DIETITIAN INITIAL EVALUATION ADULT. - PHYSICAL APPEARANCE
ht: 5 feet x inches, admit wt: 142 pounds, BMI: xx Kg/m2, IBW: xxx pounds (+/- 10%), x% IBW other (specify)/ht: unavailabe, admit wt: 142 pounds Edema: none noted  Skin: no pressure injuries noted per nursing flowsheet  Nutrition Focused Physical Exam: Pt appears well developed with no signs of muscle or fat depletion.

## 2019-11-05 NOTE — PROGRESS NOTE ADULT - ASSESSMENT
A/P: 74y Female s/p R PCN placement by Interventional Radiology for emphysematous pyelitis  - Abx  - wean insulin gtt  - f/u cultures  - OOB  - regular diet   - endo following   - SICU care

## 2019-11-05 NOTE — DIETITIAN INITIAL EVALUATION ADULT. - ADD RECOMMEND
1) Continue Consistent Carbohydrate diet with snack, VegLactOvo per pt preference. 2) RD will provide therapeutic diet education upon interview (pending) 1) Continue Consistent Carbohydrate diet with snack, Veg PescoLactOvo per pt preference. 2) Therapeutic diet education provided.

## 2019-11-05 NOTE — PROGRESS NOTE ADULT - ASSESSMENT
75 yo female with poorly controlled type 2 diabetes and HLD admitted for emphysematous pyelitis and likely HHS. Patient also noted to be septic on admission, but is improving in overall status. She will require continued monitoring and eventual follow-up with an endocrinologist for better control of her diabetes.

## 2019-11-05 NOTE — DIETITIAN INITIAL EVALUATION ADULT. - REASON INDICATOR FOR ASSESSMENT
Nutrition Assessment warranted for length of stay on 8ICU.  Information obtained from: medical record. Pt interview pending   [INCOMPLETE NOTE - IN PROGRESS]  Per chart: "75 y/o female presenting with HHS vs. DKA, septic shock, E. coli bacteremia, and right emphysematous pyelonephritis s/p right nephrostomy tube placement." Nutrition Assessment warranted for length of stay on 8ICU.  Information obtained from: Patient, son in law. medical record. Pt is Tamil speaking; family translates.  Per chart: "73 y/o female presenting with HHS vs. DKA, septic shock, E. coli bacteremia, and right emphysematous pyelonephritis s/p right nephrostomy tube placement."

## 2019-11-05 NOTE — PROGRESS NOTE ADULT - PROBLEM SELECTOR PLAN 1
- poorly controlled on metformin 500mg BID at home with acute hyperglycemic episode on this admission, HA1C 10.6  - patient given 18 units NPH with ending of repeat insulin infusion this am, can give lantus tonight  - based on recent infusion data, patient requiring avg 80 units insulin/24 hrs (5+4+3+3+3+2 units in last 6 hours); Would give 38 units lantus at night and 12 units humalog with meals.  - continue to closely monitor FS for re-elevation or potential hypoglycemia  - will adjust basal-bolus regimen based on continued insulin need  - Dispo: patient understands will likely need insulin long-term on discharge given long-term diabetes history and poor control; does not have current endocrinologist but does have medicaid community plan insurance; should follow up after discharge with [facility] endocrinology clinic vs. [place] based on ease for patient for continued management of diabetes and insulin once d/c'd from hospital. - poorly controlled on metformin 500mg BID at home with acute hyperglycemic episode on this admission, HA1C 10.6  - patient given 18 units NPH with ending of repeat insulin infusion this am, can give lantus tonight  - based on recent infusion data, patient requiring avg 80 units insulin/24 hrs (5+4+3+3+3+2 units in last 6 hours); Would give 35 units lantus at night tonight (as pt got NPH this morning), and 12 units humalog with meals.  - continue to closely monitor FS for re-elevation or potential hypoglycemia  - will adjust basal-bolus regimen based on continued insulin need  - Dispo: patient understands will likely need insulin long-term on discharge given long-term diabetes history and poor control; does not have current endocrinologist but does have medicaid community plan insurance; should follow up after discharge with [facility] endocrinology clinic vs. [place] based on ease for patient for continued management of diabetes and insulin once d/c'd from hospital.

## 2019-11-05 NOTE — DIETITIAN INITIAL EVALUATION ADULT. - PERTINENT MEDS FT
MEDICATIONS  (STANDING):  atorvastatin 10 milliGRAM(s) Oral at bedtime  chlorhexidine 2% Cloths 1 Application(s) Topical <User Schedule>  dextrose 50% Injectable 25 milliLiter(s) IV Push every 15 minutes  enoxaparin Injectable 40 milliGRAM(s) SubCutaneous daily  insulin lispro Injectable (HumaLOG) 12 Unit(s) SubCutaneous three times a day before meals  insulin NPH human recombinant 18 Unit(s) SubCutaneous once  insulin regular Infusion 4 Unit(s)/Hr (4 mL/Hr) IV Continuous <Continuous>  lactated ringers 1000 milliLiter(s) (50 mL/Hr) IV Continuous <Continuous>  piperacillin/tazobactam IVPB.. 3.375 Gram(s) IV Intermittent every 8 hours  potassium acid phosphate/sodium acid phosphate tablet (K-PHOS No. 2) 1 Tablet(s) Oral four times a day with meals  senna 2 Tablet(s) Oral at bedtime

## 2019-11-06 PROBLEM — E11.9 TYPE 2 DIABETES MELLITUS WITHOUT COMPLICATIONS: Chronic | Status: ACTIVE | Noted: 2019-11-03

## 2019-11-06 PROBLEM — E78.5 HYPERLIPIDEMIA, UNSPECIFIED: Chronic | Status: ACTIVE | Noted: 2019-11-03

## 2019-11-06 LAB
ANION GAP SERPL CALC-SCNC: 9 MMOL/L — SIGNIFICANT CHANGE UP (ref 5–17)
BUN SERPL-MCNC: 8 MG/DL — SIGNIFICANT CHANGE UP (ref 7–23)
CALCIUM SERPL-MCNC: 8.8 MG/DL — SIGNIFICANT CHANGE UP (ref 8.4–10.5)
CHLORIDE SERPL-SCNC: 111 MMOL/L — HIGH (ref 96–108)
CO2 SERPL-SCNC: 23 MMOL/L — SIGNIFICANT CHANGE UP (ref 22–31)
CREAT SERPL-MCNC: 0.89 MG/DL — SIGNIFICANT CHANGE UP (ref 0.5–1.3)
GLUCOSE BLDC GLUCOMTR-MCNC: 161 MG/DL — HIGH (ref 70–99)
GLUCOSE BLDC GLUCOMTR-MCNC: 196 MG/DL — HIGH (ref 70–99)
GLUCOSE BLDC GLUCOMTR-MCNC: 211 MG/DL — HIGH (ref 70–99)
GLUCOSE BLDC GLUCOMTR-MCNC: 215 MG/DL — HIGH (ref 70–99)
GLUCOSE BLDC GLUCOMTR-MCNC: 269 MG/DL — HIGH (ref 70–99)
GLUCOSE SERPL-MCNC: 187 MG/DL — HIGH (ref 70–99)
HCT VFR BLD CALC: 30.8 % — LOW (ref 34.5–45)
HGB BLD-MCNC: 10.1 G/DL — LOW (ref 11.5–15.5)
MAGNESIUM SERPL-MCNC: 2 MG/DL — SIGNIFICANT CHANGE UP (ref 1.6–2.6)
MCHC RBC-ENTMCNC: 29.3 PG — SIGNIFICANT CHANGE UP (ref 27–34)
MCHC RBC-ENTMCNC: 32.8 GM/DL — SIGNIFICANT CHANGE UP (ref 32–36)
MCV RBC AUTO: 89.3 FL — SIGNIFICANT CHANGE UP (ref 80–100)
NRBC # BLD: 0 /100 WBCS — SIGNIFICANT CHANGE UP (ref 0–0)
PHOSPHATE SERPL-MCNC: 3.7 MG/DL — SIGNIFICANT CHANGE UP (ref 2.5–4.5)
PLATELET # BLD AUTO: 175 K/UL — SIGNIFICANT CHANGE UP (ref 150–400)
POTASSIUM SERPL-MCNC: 4.5 MMOL/L — SIGNIFICANT CHANGE UP (ref 3.5–5.3)
POTASSIUM SERPL-SCNC: 4.5 MMOL/L — SIGNIFICANT CHANGE UP (ref 3.5–5.3)
RBC # BLD: 3.45 M/UL — LOW (ref 3.8–5.2)
RBC # FLD: 13.5 % — SIGNIFICANT CHANGE UP (ref 10.3–14.5)
SODIUM SERPL-SCNC: 143 MMOL/L — SIGNIFICANT CHANGE UP (ref 135–145)
WBC # BLD: 7.33 K/UL — SIGNIFICANT CHANGE UP (ref 3.8–10.5)
WBC # FLD AUTO: 7.33 K/UL — SIGNIFICANT CHANGE UP (ref 3.8–10.5)

## 2019-11-06 PROCEDURE — 99231 SBSQ HOSP IP/OBS SF/LOW 25: CPT

## 2019-11-06 PROCEDURE — 99232 SBSQ HOSP IP/OBS MODERATE 35: CPT | Mod: GC

## 2019-11-06 PROCEDURE — 99232 SBSQ HOSP IP/OBS MODERATE 35: CPT

## 2019-11-06 RX ORDER — INSULIN GLARGINE 100 [IU]/ML
34 INJECTION, SOLUTION SUBCUTANEOUS AT BEDTIME
Refills: 0 | Status: DISCONTINUED | OUTPATIENT
Start: 2019-11-06 | End: 2019-11-08

## 2019-11-06 RX ORDER — INSULIN LISPRO 100/ML
13 VIAL (ML) SUBCUTANEOUS
Refills: 0 | Status: DISCONTINUED | OUTPATIENT
Start: 2019-11-06 | End: 2019-11-06

## 2019-11-06 RX ORDER — INSULIN GLARGINE 100 [IU]/ML
36 INJECTION, SOLUTION SUBCUTANEOUS AT BEDTIME
Refills: 0 | Status: DISCONTINUED | OUTPATIENT
Start: 2019-11-06 | End: 2019-11-06

## 2019-11-06 RX ORDER — INSULIN LISPRO 100/ML
16 VIAL (ML) SUBCUTANEOUS
Refills: 0 | Status: DISCONTINUED | OUTPATIENT
Start: 2019-11-06 | End: 2019-11-07

## 2019-11-06 RX ORDER — ATORVASTATIN CALCIUM 80 MG/1
1 TABLET, FILM COATED ORAL
Qty: 0 | Refills: 0 | DISCHARGE

## 2019-11-06 RX ORDER — CEFTRIAXONE 500 MG/1
2000 INJECTION, POWDER, FOR SOLUTION INTRAMUSCULAR; INTRAVENOUS EVERY 24 HOURS
Refills: 0 | Status: DISCONTINUED | OUTPATIENT
Start: 2019-11-06 | End: 2019-11-08

## 2019-11-06 RX ADMIN — Medication 2: at 11:50

## 2019-11-06 RX ADMIN — CHLORHEXIDINE GLUCONATE 1 APPLICATION(S): 213 SOLUTION TOPICAL at 07:09

## 2019-11-06 RX ADMIN — SENNA PLUS 2 TABLET(S): 8.6 TABLET ORAL at 21:16

## 2019-11-06 RX ADMIN — Medication 2: at 08:24

## 2019-11-06 RX ADMIN — INSULIN GLARGINE 34 UNIT(S): 100 INJECTION, SOLUTION SUBCUTANEOUS at 21:16

## 2019-11-06 RX ADMIN — Medication 1 TABLET(S): at 21:16

## 2019-11-06 RX ADMIN — ATORVASTATIN CALCIUM 10 MILLIGRAM(S): 80 TABLET, FILM COATED ORAL at 21:16

## 2019-11-06 RX ADMIN — Medication 16 UNIT(S): at 17:22

## 2019-11-06 RX ADMIN — Medication 1 TABLET(S): at 11:50

## 2019-11-06 RX ADMIN — Medication 2: at 21:17

## 2019-11-06 RX ADMIN — ENOXAPARIN SODIUM 40 MILLIGRAM(S): 100 INJECTION SUBCUTANEOUS at 11:51

## 2019-11-06 RX ADMIN — CEFTRIAXONE 100 MILLIGRAM(S): 500 INJECTION, POWDER, FOR SOLUTION INTRAMUSCULAR; INTRAVENOUS at 01:41

## 2019-11-06 RX ADMIN — Medication 4: at 17:20

## 2019-11-06 RX ADMIN — Medication 1 TABLET(S): at 01:41

## 2019-11-06 RX ADMIN — Medication 1 TABLET(S): at 17:20

## 2019-11-06 RX ADMIN — Medication 12 UNIT(S): at 08:24

## 2019-11-06 RX ADMIN — Medication 1 TABLET(S): at 08:23

## 2019-11-06 NOTE — PROGRESS NOTE ADULT - SUBJECTIVE AND OBJECTIVE BOX
Interventional Radiology    S/P Right percutaneous nephrostomy tube placement, POD # 3.      Pt speaks limited english.  Pt denies N/V, or abdominal pain, or flank pain.      Pt was weaned off of pressors on 11/4/19.  WBC count has trended down to 7.33.  Garcia was discontinued and pt passed trail of void.     Vital Signs Last 24 Hrs  T(C): 37.2 (06 Nov 2019 07:00), Max: 37.8 (05 Nov 2019 19:00)  T(F): 99 (06 Nov 2019 07:00), Max: 100 (05 Nov 2019 19:00)  HR: 108 (06 Nov 2019 10:00) (85 - 108)  BP: 113/56 (06 Nov 2019 09:00) (91/54 - 133/69)  BP(mean): 80 (06 Nov 2019 09:00) (67 - 96)  RR: 32 (06 Nov 2019 10:00) (17 - 42)  SpO2: 100% (06 Nov 2019 10:00) (95% - 100%)    General Appearance: NAD, seen sitting in chair in room.    Procedure Site (R flank): dressing is C/D/I, catheter draining clear urine to leg bag.    I&O's Detail      OUT:    Right Nephrostomy Tube: 125 mL     LABS:                      10.1   7.33  )-----------( 175      ( 06 Nov 2019 00:35 )             30.8     11-06    143  |  111<H>  |  8   ----------------------------<  187<H>  4.5   |  23  |  0.89    Ca    8.8      06 Nov 2019 00:35  Phos  3.7     11-06  Mg     2.0     11-06    A/P  74y Female with E. Coli Bacteremia, Right emphysematous pyelonephritis S/P Right perc nephrostomy (PCN)    Continue global medical management as per SICU team/primary team.    Forward flush the catheter daily as ordered, maintain dressing over the site. Monitor and record daily outputs from R PCN.      SON Dumont  [facility] [id number]  Ext [phone] Interventional Radiology    S/P Right percutaneous nephrostomy tube placement, POD # 3.      Pt speaks limited english.  Pt denies N/V, or abdominal pain, or flank pain.      Pt was weaned off of pressors on 11/4/19.  WBC count has trended down to 7.33.  Garcia was discontinued and pt passed trail of void.     Vital Signs Last 24 Hrs  T(C): 37.2 (06 Nov 2019 07:00), Max: 37.8 (05 Nov 2019 19:00)  T(F): 99 (06 Nov 2019 07:00), Max: 100 (05 Nov 2019 19:00)  HR: 108 (06 Nov 2019 10:00) (85 - 108)  BP: 113/56 (06 Nov 2019 09:00) (91/54 - 133/69)  BP(mean): 80 (06 Nov 2019 09:00) (67 - 96)  RR: 32 (06 Nov 2019 10:00) (17 - 42)  SpO2: 100% (06 Nov 2019 10:00) (95% - 100%)    General Appearance: NAD, seen sitting in chair in room.    Procedure Site (R flank): dressing is C/D/I, catheter draining clear urine to leg bag.    I&O's Detail      OUT:    Right Nephrostomy Tube: 125 mL     LABS:                      10.1   7.33  )-----------( 175      ( 06 Nov 2019 00:35 )             30.8     11-06    143  |  111<H>  |  8   ----------------------------<  187<H>  4.5   |  23  |  0.89    Ca    8.8      06 Nov 2019 00:35  Phos  3.7     11-06  Mg     2.0     11-06    A/P  74y Female with E. Coli Bacteremia, Right emphysematous pyelonephritis S/P Right perc nephrostomy (PCN)    Continue global medical management as per SICU team/primary team.    Forward flush the catheter daily as ordered, maintain dressing over the site. Monitor and record daily outputs from R PCN.    Will d/w IR attending.    SON Dumont  [facility] [id number]  Ext [phone]

## 2019-11-06 NOTE — PROGRESS NOTE ADULT - ASSESSMENT
75 y/o female presenting with HHS vs. DKA, septic shock, E. coli bacteremia, and right emphysematous pyelonephritis s/p right nephrostomy tube placement    PLAN:    Neuro: acute post-procedure pain  - Monitor mental status  - Pain control as needed with acetaminophen (will not give more than 2 grams daily in the setting of cirrhosis)    Resp: no acute issues  - Monitor pulse oximeter  - Out of bed to chair, ambulate as tolerated, and incentive spirometry to prevent atelectasis    CV: septic shock (resolved)  - Monitor vital signs    GI: no acute issues  - Regular diet as tolerated  - Bowel regimen with senna and Miralax    Renal: likely CKD stage II, right emphysematous pyelonephritis  - Monitor I&Os  - Monitor electrolytes and replete as necessary  - Monitor right nephrostomy drain output    Heme: normocytic anemia  - Monitor CBC and coags  - Lovenox for VTE prophylaxis    ID: E. coli bacteremia, right emphysematous pyelonephritis  - Monitor WBC, temperature, and procalcitonin  - Blood and urine cultures from 11/3 positive for E. coli, blood cultures from 11/4 with no growth to date  - 14 day course of ceftriaxone for E. coli bacteremia    Endo: DM type II, HSS vs. DKA (resolved)  - Lantus 35 units at bedtime with Humalog 12 units pre-meal, glucose elevated before lunch and at bedtime so will increase breakfast and dinner pre-meal to 16 units  - Endocrine recommendations appreciated    Disposition:  - Full code  - Stable for tranfer to floors    Helen Chandler PA-C     [id number]

## 2019-11-06 NOTE — PROGRESS NOTE ADULT - SUBJECTIVE AND OBJECTIVE BOX
Chief Complaint: right flank pain, R emphysematous pyelitis  Consult Reason: diabetes management    Interval History:  Patient did not require insulin infusion overnight; almonte was d/c'd and patient passed trial to void. Patient seen and examined at bedside this morning; patient denied  service, requested son-in-law as  for discussion. She states that she feels okay this morning with no complaints. Has been able to walk around this morning. She denies pain, at the nephrostomy tube site or otherwise. She also denies headache, lightheadedness, fever/chills, chest pain, abdominal pain, nausea/vomiting, diarrhea/constipation, polyuria/polydipsia, dysuria.    MEDICATIONS  (STANDING):  atorvastatin 10 milliGRAM(s) Oral at bedtime  cefTRIAXone   IVPB 2000 milliGRAM(s) IV Intermittent every 24 hours  chlorhexidine 2% Cloths 1 Application(s) Topical <User Schedule>  enoxaparin Injectable 40 milliGRAM(s) SubCutaneous daily  insulin glargine Injectable (LANTUS) 35 Unit(s) SubCutaneous at bedtime  insulin lispro (HumaLOG) corrective regimen sliding scale   SubCutaneous three times a day before meals  insulin lispro (HumaLOG) corrective regimen sliding scale   SubCutaneous at bedtime  insulin lispro Injectable (HumaLOG) 12 Unit(s) SubCutaneous three times a day before meals  potassium acid phosphate/sodium acid phosphate tablet (K-PHOS No. 2) 1 Tablet(s) Oral four times a day with meals  senna 2 Tablet(s) Oral at bedtime    MEDICATIONS  (PRN):  acetaminophen   Tablet .. 650 milliGRAM(s) Oral every 8 hours PRN Mild Pain (1 - 3)  polyethylene glycol 3350 17 Gram(s) Oral every 24 hours PRN Constipation      Allergies    No Known Allergies    Intolerances  None known        PHYSICAL EXAM:  VITALS: T(C): 37.2 (11-06-19 @ 07:00)  T(F): 99 (11-06-19 @ 07:00), Max: 100 (11-05-19 @ 19:00)  HR: 108 (11-06-19 @ 10:00) (85 - 108)  BP: 113/56 (11-06-19 @ 09:00) (91/54 - 133/69)  RR:  (17 - 42)  SpO2:  (95% - 100%)  Wt(kg): --    GENERAL: NAD, A+Ox3, sitting in chair comfortably  HEENT:  Atraumatic, Normocephalic  RESPIRATORY: Clear to auscultation bilaterally; No rales, rhonchi, wheezing, or rubs  CARDIOVASCULAR: Regular rate and rhythm; No murmurs; no peripheral edema  GI: Soft, nontender, non distended, normal bowel sounds; righ nephrostomy tube with site C/D/I, clear drainage to leg bag    POCT Blood Glucose.: 161 mg/dL (11-06-19 @ 08:20)  POCT Blood Glucose.: 273 mg/dL (11-05-19 @ 21:03)  POCT Blood Glucose.: 176 mg/dL (11-05-19 @ 17:30)  POCT Blood Glucose.: 226 mg/dL (11-05-19 @ 11:57)  POCT Blood Glucose.: 143 mg/dL (11-05-19 @ 08:11)  POCT Blood Glucose.: 113 mg/dL (11-05-19 @ 06:52)  POCT Blood Glucose.: 97 mg/dL (11-05-19 @ 06:03)  POCT Blood Glucose.: 109 mg/dL (11-05-19 @ 05:01)  POCT Blood Glucose.: 123 mg/dL (11-05-19 @ 04:08)  POCT Blood Glucose.: 131 mg/dL (11-05-19 @ 03:00)  POCT Blood Glucose.: 151 mg/dL (11-05-19 @ 02:06)  POCT Blood Glucose.: 186 mg/dL (11-05-19 @ 01:09)  POCT Blood Glucose.: 232 mg/dL (11-05-19 @ 00:03)  POCT Blood Glucose.: 238 mg/dL (11-04-19 @ 23:01)  POCT Blood Glucose.: 294 mg/dL (11-04-19 @ 21:58)  POCT Blood Glucose.: 317 mg/dL (11-04-19 @ 21:06)  POCT Blood Glucose.: 275 mg/dL (11-04-19 @ 15:47)  POCT Blood Glucose.: 233 mg/dL (11-04-19 @ 11:58)  POCT Blood Glucose.: 232 mg/dL (11-04-19 @ 11:04)  POCT Blood Glucose.: 221 mg/dL (11-04-19 @ 10:13)  POCT Blood Glucose.: 149 mg/dL (11-04-19 @ 09:13)  POCT Blood Glucose.: 162 mg/dL (11-04-19 @ 07:57)  POCT Blood Glucose.: 194 mg/dL (11-04-19 @ 06:54)  POCT Blood Glucose.: 231 mg/dL (11-04-19 @ 06:14)  POCT Blood Glucose.: 260 mg/dL (11-04-19 @ 05:08)  POCT Blood Glucose.: 275 mg/dL (11-04-19 @ 04:01)  POCT Blood Glucose.: 298 mg/dL (11-04-19 @ 03:03)  POCT Blood Glucose.: 350 mg/dL (11-04-19 @ 02:12)  POCT Blood Glucose.: 347 mg/dL (11-04-19 @ 01:05)  POCT Blood Glucose.: 316 mg/dL (11-04-19 @ 00:20)      11-06    143  |  111<H>  |  8   ----------------------------<  187<H>  4.5   |  23  |  0.89    EGFR if : 75  EGFR if non : 64    Ca    8.8      11-06  Mg     2.0     11-06  Phos  3.7     11-06    TPro  5.9<L>  /  Alb  3.0<L>  /  TBili  1.0  /  DBili  x   /  AST  20  /  ALT  15  /  AlkPhos  80  11-03      Thyroid Function Tests:  11-03 @ 10:42 TSH 0.23 FreeT4 -- T3 -- Anti TPO -- Anti Thyroglobulin Ab -- TSI --      Hemoglobin A1C, Whole Blood: 10.6 % <H> [4.0 - 5.6] (11-03-19 @ 14:28)

## 2019-11-06 NOTE — PROGRESS NOTE ADULT - ASSESSMENT
A/P: 74y Female s/p R PCN placement by Interventional Radiology for emphysematous pyelitis  - Abx  - f/u cultures  - OOB  - regular diet   - endo following   - SICU care   - PT

## 2019-11-06 NOTE — PROGRESS NOTE ADULT - SUBJECTIVE AND OBJECTIVE BOX
UROLOGY DAILY PROGRESS NOTE:     Subjective: Patient seen and examined at bedside. No overnight events. Feeling better. OOB to chair. Tolerating regular diet.  Voiding.   Weaned off insulin gtt last night.      Objective:  Vital signs  T(F): , Max: 100 (19 @ 19:00)  HR: 103 (19 @ 06:00)  BP: 133/69 (19 @ 06:00)  SpO2: 100% (19 @ 06:00)  Wt(kg): --    I&O's Summary    2019 07:  -  2019 07:00  --------------------------------------------------------  IN: 630 mL / OUT: 2040 mL / NET: -1410 mL    I&O's Detail    2019 07:  -  2019 07:00  --------------------------------------------------------  IN:    Instillation: 5 mL    IV PiggyBack: 175 mL    lactated ringers: 100 mL    Oral Fluid: 150 mL    Solution: 200 mL  Total IN: 630 mL    OUT:    Incontinent per Collection Ba mL    Indwelling Catheter - Urethral: 385 mL    Nephrostomy Tube: 125 mL    Voided: 930 mL  Total OUT: 2040 mL    Total NET: -1410 mL          Gen: NAD  Pulm: No respiratory distress, no subcostal retractions  CV: RRR, no JVD  Abd: Soft, NT, ND  Back: NT draining clear urine     Labs:    7.33  / 30.8  /0.89     11.33 / 28.8  /0.90                           10.1   7.33  )-----------( 175      ( 2019 00:35 )             30.8         143  |  111<H>  |  8   ----------------------------<  187<H>  4.5   |  23  |  0.89    Ca    8.8      2019 00:35  Phos  3.7     11-  Mg     2.0     -06          Urine Cx:  Culture - Urine (19 @ 21:55)    -  Amikacin: S <=16    -  Ampicillin: R >16 These ampicillin results predict results for amoxicillin    -  Ampicillin/Sulbactam: R >16/8 Enterobacter, Citrobacter, and Serratia may develop resistance during prolonged therapy (3-4 days)    -  Aztreonam: S <=4    -  Cefazolin: S <=8 ([id number]) For uncomplicated UTI with K. pneumoniae, E. coli, or P. mirablis: GERARD <=16 is sensitive and GERARD >=32 is resistant. This also predicts results for oral agents cefaclor, cefdinir, cefpodoxime, cefprozil, cefuroxime axetil, cephalexin and locarbef for uncomplicated UTI. Note that some isolates may be susceptible to these agents while testing resistant to cefazolin.    -  Cefepime: S <=4    -  Cefoxitin: S <=8    -  Ceftriaxone: S <=1 Enterobacter, Citrobacter, and Serratia may develop resistance during prolonged therapy    -  Ciprofloxacin: S <=1    -  Gentamicin: S <=4    -  Imipenem: S <=1    -  Levofloxacin: S <=2    -  Meropenem: S <=1    -  Nitrofurantoin: S <=32 Should not be used to treat pyelonephritis    -  Piperacillin/Tazobactam: S <=16    -  Tigecycline: S <=2    -  Tobramycin: S <=4    -  Trimethoprim/Sulfamethoxazole: S <=2/38    Specimen Source: .Urine Nephrostomy - Right    Culture Results:   50,000 - 99,000 CFU/mL Escherichia coli    Organism Identification: Escherichia coli    Organism: Escherichia coli    Method Type: GERARD    Culture - Blood (19 @ 22:53)    Specimen Source: .Blood Blood-Venous    Culture Results:   No growth to date.

## 2019-11-06 NOTE — PROGRESS NOTE ADULT - PROBLEM SELECTOR PLAN 1
- poorly controlled on metformin 500mg BID at home with acute hyperglycemic episode on this admission, HA1C 10.6  - sugars remain elevated, with highest after dinner/breakfast, got 15 units corrective insulin over last 24 hours, total of 81units over 24 hrs; would minimally increase basal, and increase mealtime insulin  - would increase insulin to 36 units lantus at bedtime, and 13 units humalog with meals  - continue to closely monitor FS for re-elevation or potential hypoglycemia  - will adjust basal-bolus regimen based on continued insulin need  - Dispo: patient understands will likely need insulin long-term on discharge given long-term diabetes history and poor control; does not have current endocrinologist but does have medicaid community plan insurance; should follow up after discharge with [facility] endocrinology clinic vs. [place] based on ease for patient for continued management of diabetes and insulin once d/c'd from hospital. - poorly controlled on metformin 500mg BID at home with acute hyperglycemic episode on this admission, HA1C 10.6  - sugars remain elevated primarily after dinner/breakfast, with fasting morning glu in 90's; got 15 units corrective insulin over last 24 hours, total of 81units over 24 hrs; would not increase basal insulin, but increase mealtime insulin  - would decrease lantus to 34 units at bedtime, and increase short-acting to 16 units humalog with meals  - continue to closely monitor FS for re-elevation or potential hypoglycemia  - will adjust basal-bolus regimen based on continued insulin need  - Dispo: patient understands will likely need insulin long-term on discharge given long-term diabetes history and poor control; does not have current endocrinologist but does have medicaid community plan insurance; should follow up after discharge with [facility] endocrinology clinic vs. [place] based on ease for patient for continued management of diabetes and insulin once d/c'd from hospital.

## 2019-11-06 NOTE — PROGRESS NOTE ADULT - SUBJECTIVE AND OBJECTIVE BOX
HISTORY:  74 year old female with a PMHx not limited to DM type II and HLD (rest unknown) who presented initially to [facility] on 11/3 with glucose in the 600s, nausea/vomiting, and right flank pain. She was only taking metformin that was prescribed in [place] and has not been following up with doctors in the [place] due to lack of insurance. Labs were significant for a leukocytosis. She was cultured, started on antibiotics, given 3 L of crystalloid, and admitted to medicine for glycemic control. CT abdomen/pelvis was obtained for her right flank pain, which revealed right emphysematous pyelonephritis. Patient was subsequently transferred to [facility] for emergent right nephrostomy tube placement with IR. She became hypotensive and was given an additional liter of crystalloid before being started on vasopressor support. Patient was admitted to SICU for hemodynamic monitoring and glycemic control with an insulin infusion.    24 HOUR EVENTS: HISTORY:  74 year old female with a PMHx not limited to DM type II and HLD (rest unknown) [facility] with glucose in the 600s, nausea/vomiting, and right flank pain. She was only taking metformin that was prescribed in [place] and has not been following up with doctors in the [place] due to lack of insurance. Labs were significant for a leukocytosis. She was cultured, started on antibiotics, given 3 L of crystalloid, and admitted to medicine for glycemic control. CT abdomen/pelvis was obtained for her right flank pain, which revealed right emphysematous pyelonephritis. Patient was subsequently transferred to [facility] for emergent right nephrostomy tube placement with IR. She became hypotensive and was given an additional liter of crystalloid before being started on vasopressor support. Patient was admitted to SICU for hemodynamic monitoring and glycemic control with an insulin infusion.    24 HOUR EVENTS:  - IV locked  - Garcia discontinued and passed trial of void  - Given NPH 18 units in the morning x1 dose to bridge off insulin infusion and started on Lantus 35 units at bedtime with Humalog 12 units pre-meal  - Deescalated Zosyn to ceftriaxone based on sensitivities from blood and urine culture collected on 11/3    SUBJECTIVE/ROS:  [x] A ten-point review of systems was otherwise negative except as noted.  [ ] Due to altered mental status/intubation, subjective information were not able to be obtained from the patient. History was obtained, to the extent possible, from review of the chart and collateral sources of information.    NEURO  Exam: awake, alert, oriented x4, no acute distress, no focal deficits  Meds: acetaminophen   Tablet .. 650 milliGRAM(s) Oral every 8 hours PRN Mild Pain (1 - 3)  [x] Adequacy of sedation and pain control has been assessed and adjusted    RESPIRATORY  RR: 29 (11-06-19 @ 05:00) (17 - 42)  SpO2: 95% (11-06-19 @ 05:00) (95% - 100%)  Exam: clear to auscultation bilaterally  Mechanical Ventilation: no  [N/A] Extubation Readiness Assessed  Meds: none    CARDIOVASCULAR  HR: 92 (11-06-19 @ 05:00) (84 - 101)  BP: 133/69 (11-06-19 @ 05:00) (84/52 - 133/69)  BP(mean): 95 (11-06-19 @ 05:00) (64 - 95)  Exam: regular rate and rhythm, S1S2  Cardiac Rhythm: sinus  Perfusion    [x]Adequate    [ ]Inadequate  Mentation   [x]Normal       [ ]Reduced  Extremities  [X]Warm         [ ]Cool  Volume Status [ ]Hypervolemic [x]Euvolemic [ ]Hypovolemic  Meds: none    GI/NUTRITION  Exam: soft, nontender, nondistended, right nephrostomy tube with pink lemonade-colored urine  Diet: regular  Meds:  - polyethylene glycol 3350 17 Gram(s) Oral every 24 hours PRN Constipation  - senna 2 Tablet(s) Oral at bedtime    GENITOURINARY  I&O's Detail  11-05 @ 07:01  -  11-06 @ 05:17  --------------------------------------------------------  IN:    IV PiggyBack: 175 mL    lactated ringers: 100 mL    Oral Fluid: 150 mL    Solution: 200 mL  Total IN: 625 mL    OUT:    Indwelling Catheter - Urethral: 385 mL    Nephrostomy Tube: 25 mL    Voided: 730 mL  Total OUT: 1140 mL    Total NET: -515 mL    143  |  111<H>  |  8   ----------------------------<  187<H>  4.5   |  23  |  0.89    Ca    8.8      06 Nov 2019 00:35  Phos  3.7  Mg     2.0    [ ] Garcia catheter, indication: N/A  Meds: none    HEMATOLOGIC  Meds: enoxaparin Injectable 40 milliGRAM(s) SubCutaneous daily  [x] VTE Prophylaxis                        10.1   7.33  )-----------( 175      ( 06 Nov 2019 00:35 )             30.8     INFECTIOUS DISEASES  T(C): 37.5 (11-06-19 @ 03:00), Max: 37.8 (11-05-19 @ 19:00)  WBC Count: 7.33 K/uL (11-06 @ 00:35)  Recent Cultures:  - Specimen Source: .Blood Blood-Venous, 11-03 @ 22:53  Results: No growth to date.  Gram Stain: --  Organism: --  - Specimen Source: .Urine Nephrostomy - Right, 11-03 @ 21:55  Results: 50,000 - 99,000 CFU/mL Escherichia coli  Gram Stain: --  Organism: Escherichia coli  - Specimen Source: .Blood, 11-03 @ 15:07  Results: Growth in aerobic and anaerobic bottles: Escherichia coli  Gram Stain: Growth in aerobic & anaerobic bottle: Gram Negative Rods  Organism: Blood Culture PCR Escherichia coli  - Specimen Source: .Urine, 11-03 @ 15:02  Results: >100,000 CFU/ml Escherichia coli  Gram Stain: --  Organism: Escherichia coli  Meds: cefTRIAXone   IVPB 2000 milliGRAM(s) IV Intermittent every 24 hours    ENDOCRINE  Capillary Blood Glucose:  - 273 mg/dL (05 Nov 2019 21:03)  - 176 mg/dL (05 Nov 2019 17:30)  - 226 mg/dL (05 Nov 2019 11:57)  - 143 mg/dL (05 Nov 2019 08:11)  - 113 mg/dL (05 Nov 2019 06:52)  - 97 mg/dL (05 Nov 2019 06:03)  Meds:  - atorvastatin 10 milliGRAM(s) Oral at bedtime  - insulin glargine Injectable (LANTUS) 35 Unit(s) SubCutaneous at bedtime  - insulin lispro (HumaLOG) corrective regimen sliding scale   SubCutaneous three times a day before meals  - insulin lispro (HumaLOG) corrective regimen sliding scale   SubCutaneous at bedtime  - insulin lispro Injectable (HumaLOG) 12 Unit(s) SubCutaneous three times a day before meals    ACCESS DEVICES:  [x] Peripheral IV  [ ] Central Venous Line	[ ] R	[ ] L	[ ] IJ	[ ] Fem	[ ] SC	Placed:   [ ] Arterial Line		[ ] R	[ ] L	[ ] Fem	[ ] Rad	[ ] Ax	Placed:   [ ] PICC:					[ ] Mediport  [ ] Urinary Catheter, Date Placed:  [x] Necessity of urinary, arterial, and venous catheters discussed    OTHER MEDICATIONS: chlorhexidine 2% Cloths 1 Application(s) Topical <User Schedule>    CODE STATUS: Full code    IMAGING:

## 2019-11-07 LAB
ANION GAP SERPL CALC-SCNC: 11 MMOL/L — SIGNIFICANT CHANGE UP (ref 5–17)
BUN SERPL-MCNC: 6 MG/DL — LOW (ref 7–23)
CALCIUM SERPL-MCNC: 9.2 MG/DL — SIGNIFICANT CHANGE UP (ref 8.4–10.5)
CHLORIDE SERPL-SCNC: 107 MMOL/L — SIGNIFICANT CHANGE UP (ref 96–108)
CO2 SERPL-SCNC: 23 MMOL/L — SIGNIFICANT CHANGE UP (ref 22–31)
CREAT SERPL-MCNC: 0.8 MG/DL — SIGNIFICANT CHANGE UP (ref 0.5–1.3)
GLUCOSE BLDC GLUCOMTR-MCNC: 117 MG/DL — HIGH (ref 70–99)
GLUCOSE BLDC GLUCOMTR-MCNC: 161 MG/DL — HIGH (ref 70–99)
GLUCOSE BLDC GLUCOMTR-MCNC: 201 MG/DL — HIGH (ref 70–99)
GLUCOSE BLDC GLUCOMTR-MCNC: 203 MG/DL — HIGH (ref 70–99)
GLUCOSE BLDC GLUCOMTR-MCNC: 282 MG/DL — HIGH (ref 70–99)
GLUCOSE SERPL-MCNC: 235 MG/DL — HIGH (ref 70–99)
HCT VFR BLD CALC: 33.4 % — LOW (ref 34.5–45)
HGB BLD-MCNC: 10.4 G/DL — LOW (ref 11.5–15.5)
MAGNESIUM SERPL-MCNC: 1.8 MG/DL — SIGNIFICANT CHANGE UP (ref 1.6–2.6)
MCHC RBC-ENTMCNC: 28 PG — SIGNIFICANT CHANGE UP (ref 27–34)
MCHC RBC-ENTMCNC: 31.1 GM/DL — LOW (ref 32–36)
MCV RBC AUTO: 90 FL — SIGNIFICANT CHANGE UP (ref 80–100)
NRBC # BLD: 0 /100 WBCS — SIGNIFICANT CHANGE UP (ref 0–0)
PHOSPHATE SERPL-MCNC: 3.9 MG/DL — SIGNIFICANT CHANGE UP (ref 2.5–4.5)
PLATELET # BLD AUTO: 224 K/UL — SIGNIFICANT CHANGE UP (ref 150–400)
POTASSIUM SERPL-MCNC: 4.1 MMOL/L — SIGNIFICANT CHANGE UP (ref 3.5–5.3)
POTASSIUM SERPL-SCNC: 4.1 MMOL/L — SIGNIFICANT CHANGE UP (ref 3.5–5.3)
RBC # BLD: 3.71 M/UL — LOW (ref 3.8–5.2)
RBC # FLD: 13.2 % — SIGNIFICANT CHANGE UP (ref 10.3–14.5)
SODIUM SERPL-SCNC: 141 MMOL/L — SIGNIFICANT CHANGE UP (ref 135–145)
WBC # BLD: 6.56 K/UL — SIGNIFICANT CHANGE UP (ref 3.8–10.5)
WBC # FLD AUTO: 6.56 K/UL — SIGNIFICANT CHANGE UP (ref 3.8–10.5)

## 2019-11-07 PROCEDURE — 99232 SBSQ HOSP IP/OBS MODERATE 35: CPT

## 2019-11-07 PROCEDURE — 99232 SBSQ HOSP IP/OBS MODERATE 35: CPT | Mod: GC

## 2019-11-07 PROCEDURE — 99231 SBSQ HOSP IP/OBS SF/LOW 25: CPT

## 2019-11-07 RX ORDER — MAGNESIUM SULFATE 500 MG/ML
2 VIAL (ML) INJECTION ONCE
Refills: 0 | Status: COMPLETED | OUTPATIENT
Start: 2019-11-07 | End: 2019-11-07

## 2019-11-07 RX ORDER — INSULIN LISPRO 100/ML
18 VIAL (ML) SUBCUTANEOUS
Refills: 0 | Status: DISCONTINUED | OUTPATIENT
Start: 2019-11-07 | End: 2019-11-08

## 2019-11-07 RX ADMIN — Medication 4: at 11:38

## 2019-11-07 RX ADMIN — Medication 6: at 16:19

## 2019-11-07 RX ADMIN — ENOXAPARIN SODIUM 40 MILLIGRAM(S): 100 INJECTION SUBCUTANEOUS at 11:37

## 2019-11-07 RX ADMIN — ATORVASTATIN CALCIUM 10 MILLIGRAM(S): 80 TABLET, FILM COATED ORAL at 22:05

## 2019-11-07 RX ADMIN — Medication 16 UNIT(S): at 08:38

## 2019-11-07 RX ADMIN — CEFTRIAXONE 100 MILLIGRAM(S): 500 INJECTION, POWDER, FOR SOLUTION INTRAMUSCULAR; INTRAVENOUS at 01:08

## 2019-11-07 RX ADMIN — INSULIN GLARGINE 34 UNIT(S): 100 INJECTION, SOLUTION SUBCUTANEOUS at 22:04

## 2019-11-07 RX ADMIN — Medication 50 GRAM(S): at 04:42

## 2019-11-07 RX ADMIN — Medication 18 UNIT(S): at 16:19

## 2019-11-07 RX ADMIN — Medication 2: at 08:38

## 2019-11-07 RX ADMIN — CHLORHEXIDINE GLUCONATE 1 APPLICATION(S): 213 SOLUTION TOPICAL at 06:34

## 2019-11-07 RX ADMIN — Medication 16 UNIT(S): at 11:39

## 2019-11-07 RX ADMIN — SENNA PLUS 2 TABLET(S): 8.6 TABLET ORAL at 22:05

## 2019-11-07 NOTE — PROGRESS NOTE ADULT - ASSESSMENT
73 yo female with poorly controlled type 2 diabetes and HLD admitted for emphysematous pyelitis and likely HHS. Patient also noted to be septic on admission, but is improving in overall status. She will require continued monitoring and eventual follow-up with an endocrinologist for better control of her diabetes.

## 2019-11-07 NOTE — PROGRESS NOTE ADULT - SUBJECTIVE AND OBJECTIVE BOX
HISTORY  74y Female    24 HOUR EVENTS:    SUBJECTIVE/ROS:  [ ] A ten-point review of systems was otherwise negative except as noted.  [ ] Due to altered mental status/intubation, subjective information were not able to be obtained from the patient. History was obtained, to the extent possible, from review of the chart and collateral sources of information.    NEURO  RASS:     GCS:     CAM ICU:  Exam: awake, alert, oriented  Meds: acetaminophen   Tablet .. 650 milliGRAM(s) Oral every 8 hours PRN Mild Pain (1 - 3)    [x] Adequacy of sedation and pain control has been assessed and adjusted      RESPIRATORY  RR: 23 (19 @ 03:00) (15 - 36)  SpO2: 92% (19 @ 03:00) (92% - 100%)  Wt(kg): --  Exam: unlabored, clear to auscultation bilaterally  Mechanical Ventilation:     [N/A] Extubation Readiness Assessed  Meds:       CARDIOVASCULAR  HR: 94 (19 @ 03:00) (85 - 112)  BP: 109/58 (19 @ 03:00) (100/60 - 140/66)  BP(mean): 78 (19 @ 03:00) (73 - 96)  ABP: --  ABP(mean): --  Wt(kg): --  CVP(cm H2O): --      Exam: regular rate and rhythm  Cardiac Rhythm: sinus  Perfusion     [x]Adequate   [ ]Inadequate  Mentation   [x]Normal       [ ]Reduced  Extremities  [x]Warm         [ ]Cool  Volume Status [ ]Hypervolemic [x]Euvolemic [ ]Hypovolemic  Meds:       GI/NUTRITION  Exam: soft, nontender, nondistended, incision C/D/I  Diet:  Meds: polyethylene glycol 3350 17 Gram(s) Oral every 24 hours PRN Constipation  senna 2 Tablet(s) Oral at bedtime      GENITOURINARY  I&O's Detail     @ 07:01  -   @ 07:00  --------------------------------------------------------  IN:    Instillation: 5 mL    IV PiggyBack: 175 mL    lactated ringers: 100 mL    Oral Fluid: 150 mL    Solution: 200 mL  Total IN: 630 mL    OUT:    Incontinent per Collection Ba mL    Indwelling Catheter - Urethral: 385 mL    Nephrostomy Tube: 125 mL    Voided: 930 mL  Total OUT: 2040 mL    Total NET: -1410 mL       @ 07:01  -   @ 03:04  --------------------------------------------------------  IN:    Oral Fluid: 100 mL  Total IN: 100 mL    OUT:    Nephrostomy Tube: 150 mL    Voided: 750 mL  Total OUT: 900 mL    Total NET: -800 mL              141  |  107  |  6<L>  ----------------------------<  235<H>  4.1   |  23  |  0.80    Ca    9.2      2019 00:32  Phos  3.9       Mg     1.8           [ ] Garcia catheter, indication: N/A  Meds: potassium acid phosphate/sodium acid phosphate tablet (K-PHOS No. 2) 1 Tablet(s) Oral four times a day with meals        HEMATOLOGIC  Meds: enoxaparin Injectable 40 milliGRAM(s) SubCutaneous daily    [x] VTE Prophylaxis                        10.4   6.56  )-----------( 224      ( 2019 00:32 )             33.4       Transfusion     [ ] PRBC   [ ] Platelets   [ ] FFP   [ ] Cryoprecipitate      INFECTIOUS DISEASES  WBC Count: 6.56 K/uL ( @ 00:32)    RECENT CULTURES:  Specimen Source: .Blood Blood-Venous  Date/Time:  @ 22:53  Culture Results:   No growth to date.  Gram Stain: --  Organism: --  Specimen Source: .Urine Nephrostomy - Right  Date/Time:  @ 21:55  Culture Results:   50,000 - 99,000 CFU/mL Escherichia coli  Gram Stain: --  Organism: Escherichia coli  Specimen Source: .Blood  Date/Time:  @ 15:07  Culture Results:   Growth in aerobic and anaerobic bottles: Escherichia coli  See previous culture [id number]  Gram Stain:   Growth in anaerobic bottle: Gram Negative Rods  Growth in aerobic bottle: Gram Negative Rods  Organism: Blood Culture PCR  Escherichia coli  Specimen Source: .Urine  Date/Time:  @ 15:02  Culture Results:   >100,000 CFU/ml Escherichia coli  Gram Stain: --  Organism: Escherichia coli    Meds: cefTRIAXone   IVPB 2000 milliGRAM(s) IV Intermittent every 24 hours        ENDOCRINE  CAPILLARY BLOOD GLUCOSE      POCT Blood Glucose.: 215 mg/dL (2019 23:55)  POCT Blood Glucose.: 269 mg/dL (2019 21:13)  POCT Blood Glucose.: 211 mg/dL (2019 17:13)  POCT Blood Glucose.: 196 mg/dL (2019 11:18)  POCT Blood Glucose.: 161 mg/dL (2019 08:20)    Meds: atorvastatin 10 milliGRAM(s) Oral at bedtime  insulin glargine Injectable (LANTUS) 34 Unit(s) SubCutaneous at bedtime  insulin lispro (HumaLOG) corrective regimen sliding scale   SubCutaneous three times a day before meals  insulin lispro (HumaLOG) corrective regimen sliding scale   SubCutaneous at bedtime  insulin lispro Injectable (HumaLOG) 16 Unit(s) SubCutaneous three times a day before meals        ACCESS DEVICES:  [ ] Peripheral IV  [ ] Central Venous Line	[ ] R	[ ] L	[ ] IJ	[ ] Fem	[ ] SC	Placed:   [ ] Arterial Line		[ ] R	[ ] L	[ ] Fem	[ ] Rad	[ ] Ax	Placed:   [ ] PICC:					[ ] Mediport  [ ] Urinary Catheter, Date Placed:   [x] Necessity of urinary, arterial, and venous catheters discussed    OTHER MEDICATIONS:  chlorhexidine 2% Cloths 1 Application(s) Topical <User Schedule>      CODE STATUS:      IMAGING: HISTORY  74y Female with a PMHx not limited to DM type II and HLD (rest unknown) who presented initially to [facility] on 11/3 with glucose in the 600s, nausea/vomiting, and right flank pain. She was only taking metformin that was prescribed in [place] and has not been following up with doctors in the [place] due to lack of insurance. Labs were significant for a leukocytosis. She was cultured, started on antibiotics, given 3 L of crystalloid, and admitted to medicine for glycemic control. CT abdomen/pelvis was obtained for her right flank pain, which revealed right emphysematous pyelonephritis. Patient was subsequently transferred to [facility] for emergent right nephrostomy tube placement with IR. She became hypotensive and was given an additional liter of crystalloid before being started on vasopressor support. Patient was admitted to SICU for hemodynamic monitoring and glycemic control with an insulin infusion.    24 HOUR EVENTS:   -As per endocrine consult, adjusted bedtime lantus to 34 units and pre-meal insulin to 16 units.    SUBJECTIVE/ROS:  [x] A ten-point review of systems was otherwise negative except as noted.  [ ] Due to altered mental status/intubation, subjective information were not able to be obtained from the patient. History was obtained, to the extent possible, from review of the chart and collateral sources of information.    NEURO  Exam: awake, alert, oriented x4, no acute distress, no focal deficits  Meds: acetaminophen   Tablet .. 650 milliGRAM(s) Oral every 8 hours PRN Mild Pain (1 - 3)  [x] Adequacy of sedation and pain control has been assessed and adjusted    RESPIRATORY  RR: 23 (11-07-19 @ 03:00) (15 - 36)  SpO2: 92% (11-07-19 @ 03:00) (92% - 100%)  Wt(kg): --  Exam: unlabored, clear to auscultation bilaterally  Mechanical Ventilation: no  [N/A] Extubation Readiness Assessed  Meds: none    CARDIOVASCULAR  HR: 94 (11-07-19 @ 03:00) (85 - 112)  BP: 109/58 (11-07-19 @ 03:00) (100/60 - 140/66)  BP(mean): 78 (11-07-19 @ 03:00) (73 - 96)  Exam: regular rate and rhythm  Cardiac Rhythm: sinus  Perfusion     [x]Adequate   [ ]Inadequate  Mentation   [x]Normal       [ ]Reduced  Extremities  [x]Warm         [ ]Cool  Volume Status [ ]Hypervolemic [x]Euvolemic [ ]Hypovolemic  Meds: none    GI/NUTRITION  Exam: soft, nontender, nondistended, right nephrostomy tube with pink lemonade-colored urine  Diet: regular  Meds:   -polyethylene glycol 3350 17 Gram(s) Oral every 24 hours PRN Constipation  -senna 2 Tablet(s) Oral at bedtime    GENITOURINARY  I&O's Detail  11-06 @ 07:01  -  11-07 @ 03:04  --------------------------------------------------------  IN:    Oral Fluid: 100 mL  Total IN: 100 mL    OUT:    Nephrostomy Tube: 150 mL    Voided: 750 mL  Total OUT: 900 mL    Total NET: -800 mL    11-07    141  |  107  |  6<L>  ----------------------------<  235<H>  4.1   |  23  |  0.80    Ca    9.2      07 Nov 2019 00:32  Phos  3.9     11-07  Mg     1.8     11-07    [ ] Garcia catheter, indication: N/A  Meds:   -potassium acid phosphate/sodium acid phosphate tablet (K-PHOS No. 2) 1 Tablet(s) Oral four times a day with meals    HEMATOLOGIC  Meds: enoxaparin Injectable 40 milliGRAM(s) SubCutaneous daily  [x] VTE Prophylaxis                        10.4   6.56  )-----------( 224      ( 07 Nov 2019 00:32 )             33.4       Transfusion     [ ] PRBC   [ ] Platelets   [ ] FFP   [ ] Cryoprecipitate      INFECTIOUS DISEASES  WBC Count: 6.56 K/uL (11-07 @ 00:32)  Recent Cultures:  -Specimen Source: .Blood Blood-Venous 11-03 @ 22:53  Culture Results: No growth to date.  Gram Stain: --  Organism: --  Specimen Source: .Urine Nephrostomy - Right 11-03 @ 21:55  Culture Results: 50,000 - 99,000 CFU/mL Escherichia coli  Gram Stain: --  Organism: Escherichia coli  Specimen Source: .Blood 11-03 @ 15:07  Culture Results: Growth in aerobic and anaerobic bottles: Escherichia coli  Gram Stain: Growth in anaerobic & aerobic bottle: Gram Negative Rods  Organism: Blood Culture PCR Escherichia coli  Specimen Source: .Urine 11-03 @ 15:02  Culture Results: >100,000 CFU/ml Escherichia coli  Gram Stain:   Organism: Escherichia coli  Meds: cefTRIAXone   IVPB 2000 milliGRAM(s) IV Intermittent every 24 hours        ENDOCRINE  CAPILLARY BLOOD GLUCOSE  -215 mg/dL (06 Nov 2019 23:55)  -269 mg/dL (06 Nov 2019 21:13)  -211 mg/dL (06 Nov 2019 17:13)  Meds: atorvastatin 10 milliGRAM(s) Oral at bedtime  insulin glargine Injectable (LANTUS) 34 Unit(s) SubCutaneous at bedtime  insulin lispro (HumaLOG) corrective regimen sliding scale   SubCutaneous three times a day before meals  insulin lispro (HumaLOG) corrective regimen sliding scale   SubCutaneous at bedtime  insulin lispro Injectable (HumaLOG) 16 Unit(s) SubCutaneous three times a day before meals        ACCESS DEVICES:  [x] Peripheral IV  [ ] Central Venous Line	[ ] R	[ ] L	[ ] IJ	[ ] Fem	[ ] SC	Placed:   [ ] Arterial Line		[ ] R	[ ] L	[ ] Fem	[ ] Rad	[ ] Ax	Placed:   [ ] PICC:					[ ] Mediport  [ ] Urinary Catheter, Date Placed:   [x] Necessity of urinary, arterial, and venous catheters discussed    OTHER MEDICATIONS:  chlorhexidine 2% Cloths 1 Application(s) Topical <User Schedule>    CODE STATUS: Full Code    IMAGING: N/A

## 2019-11-07 NOTE — PROGRESS NOTE ADULT - PROBLEM SELECTOR PLAN 1
- poorly controlled on metformin 500mg BID at home with acute hyperglycemic episode on this admission, HA1C 10.6  - sugars remain elevated primarily after meals, morning fasting BG in 160's last 2 days; got 12 units corrective insulin over last 24 hours, total of ~90 units insulin over 24 hrs; would not increase basal insulin, but increase mealtime insulin  - would continue lantus at 34 units at bedtime, and increase short-acting to 18 units humalog with meals  - continue to closely monitor FS for re-elevation or potential hypoglycemia  - will adjust basal-bolus regimen based on continued insulin need  - Dispo: patient understands will likely need insulin long-term on discharge given long-term diabetes history and poor control; does not have current endocrinologist but does have medicaid community plan insurance; should follow up after discharge with [facility] endocrinology clinic vs. [place] based on ease for patient for continued management of diabetes and insulin once d/c'd from hospital.

## 2019-11-07 NOTE — PROGRESS NOTE ADULT - ASSESSMENT
74F presenting with HHS vs. DKA, septic shock, E. coli bacteremia, and right emphysematous pyelonephritis s/p right nephrostomy tube placement.    PLAN:    Neuro: acute post-procedure pain  - Monitor mental status  - Pain control as needed with acetaminophen (will not give more than 2 grams daily in the setting of cirrhosis)    Resp: no acute issues  - Monitor pulse oximeter  - Out of bed to chair, ambulate as tolerated, and incentive spirometry to prevent atelectasis    CV: septic shock (resolved)  - Monitor vital signs    GI: no acute issues  - Regular diet as tolerated  - Bowel regimen with senna and Miralax    Renal: likely CKD stage II, right emphysematous pyelonephritis  - Monitor I&Os  - Monitor electrolytes and replete as necessary  - Monitor right nephrostomy drain output    Heme: normocytic anemia  - Monitor CBC and coags  - Lovenox for VTE prophylaxis    ID: E. coli bacteremia, right emphysematous pyelonephritis  - Monitor WBC, temperature, and procalcitonin  - Blood and urine cultures from 11/3 positive for E. coli, blood cultures from 11/4 with no growth to date  - 14 day course of ceftriaxone for E. coli bacteremia    Endo: DM type II, HSS vs. DKA (resolved)  - Lantus 34 units at bedtime with Humalog 16 units pre-meal  - Endocrine recommendations appreciated    Disposition:  - Full code  - Stable for transfer to floors

## 2019-11-07 NOTE — PROGRESS NOTE ADULT - ATTENDING COMMENTS
patient seen and examined in ICU, sitting in chair, feels significantly improved. Possible transfer to floor. Keep NT to drainage and might remove Garcia tomorrow.
patient was seen and examined in AM in SICU. She feels improved. Labs reviewed. Discussed her condition with family last night. Keep Garcia to drainage. Appreciate ICU care.
patient was seen and examined in ICU. Feels well, sitting in chair, NT with clear urine noted. Garcia was removed, labs reviewed, transfer to floor, appreciate ICU care.
patient was seen and examined in ICU. Sitting in chair with no complaints. voiding well, NT draining clear urine. Labs reviewed, agree with plan as above.
Patient seen and examined and agree with above.   Patient with right emphysematous pyelonephritis s/p percutaneous nephrostomy.     Hypotension  secondary to urosepsis with E coli bacteremia  -on neosynephrine 0.1mcg/kig/min  -will keep MAP > 65mmHg  -continue antibiotics  -improving leukocytosis   -pending repeat cultures    Hyperglycemia due to uncontrolled type 2 DM - Hb A1c 10.6  -ketones in   -continue insulin drip at 3 units   -goal BG < 180  -will get endocrine consult   -Lantus 25 units today to be started   Diabetic diet     CC time: 38 minutes    Will mobilize the patient
Patient seen and examined and agree with above.   Patient with right emphysematous pyelonephritis s/p percutaneous nephrostomy.     Hypotension  secondary to urosepsis with E coli bacteremia  -resolved  -will keep MAP > 65mmHg  -continue antibiotics for gram negative kishore bacteremia.  On Ceftriaxone  -improving leukocytosis   -repeat cultures- NGTD     Hyperglycemia due to uncontrolled type 2 DM - Hb A1c 10.6  serum glucose ranging from 97 to 279 despite being on Lantus, Humulog and Insulin sliding scale  will reach out to endocrinology with effort to further optimize    Continue to encourage out of bed to chair
Patient seen and examined and agree with above.   Patient with right emphysematous pyelonephritis s/p percutaneous nephrostomy.     E coli bacteremia  - -continue ceftriaxone  -will keep MAP > 65mmHg  -leukocytosis resolved    Hyperglycemia due to uncontrolled type 2 DM - Hb A1c 10.6  -Glc > 200  -lantus 34 units at bedtime and humalog 16 units before meals   -goal BG < 180  -Diabetic diet     Continue to encourage out of bed to chair
Patient seen and examined and agree with above.   Patient with right emphysematous pyelonephritis s/p percutaneous nephrostomy.     Hypotension  secondary to urosepsis with E coli bacteremia  -resolved  -will keep MAP > 65mmHg  -continue antibiotics  -improving leukocytosis   -repeat cultures- NGTD     Hyperglycemia due to uncontrolled type 2 DM - Hb A1c 10.6  -Glc 140, 133  - Received NPH this morning  and will give lantus tonight after discussion with endocrinology  -Insulin drip overnight which was discontinued this morning   -goal BG < 180  -Humalog before meals   Diabetic diet     Continue to encourage out of bed to chair
Agree with assessment and plan as above by Dr. Fritz. Reviewed all pertinent labs, glucose values, and imaging studies. Modifications made as indicated above.     Miguel A Amaya D.O  [phone]
Agree with assessment and plan as above by Dr. Fritz. Reviewed all pertinent labs, glucose values, and imaging studies. Modifications made as indicated above.     Miguel A Amaya D.O  [phone]

## 2019-11-07 NOTE — PROGRESS NOTE ADULT - SUBJECTIVE AND OBJECTIVE BOX
UROLOGY DAILY PROGRESS NOTE:     Subjective: Patient seen and examined at bedside. No acute events overnight  Endocrine Lantus adjustment noted      Objective:  Vital signs  T(F): , Max: 98.8 (19 @ 15:00)  HR: 100 (19 @ 06:00)  BP: 123/73 (19 @ 06:00)  SpO2: 97% (19 @ 06:00)  Wt(kg): --    Output     I&O's Detail    2019 07:01  -  2019 07:00  --------------------------------------------------------  IN:    Instillation: 5 mL    Oral Fluid: 100 mL  Total IN: 105 mL    OUT:    Nephrostomy Tube: 250 mL    Voided: 1550 mL  Total OUT: 1800 mL    Total NET: -1695 mL          Physical Exam:  Gen: NAD  Abd: soft NTND   Back: R nephrostomy tube in place clear urine dressing C/D/I   : voiding     Labs:    6.56  / 33.4  /0.80     7.33  / 30.8  /0.89                           10.4   6.56  )-----------( 224      ( 2019 00:32 )             33.4         141  |  107  |  6<L>  ----------------------------<  235<H>  4.1   |  23  |  0.80    Ca    9.2      2019 00:32  Phos  3.9     11-07  Mg     1.8         LABS/RADIOLOGY RESULTS:                          10.4   6.56  )-----------( 224      ( 2019 00:32 )             33.4       141  |  107  |  6<L>  ----------------------------<  235<H>  4.1   |  23  |  0.80    Ca    9.2      2019 00:32  Phos  3.9     11-07  Mg     1.8     11-07    Blood Cultures    Urinalysis Basic - ( 2019 02:54 )    Color: Yellow / Appearance: Clear / S.010 / pH:   Gluc:  / Ketone: Moderate  / Bili: Negative / Urobili: Negative   Blood:  / Protein: 30 mg/dL / Nitrite: Negative   Leuk Esterase: Small / RBC: >50 /HPF / WBC 3-5 /HPF   Sq Epi:  / Non Sq Epi: Occasional /HPF / Bacteria: Negative /HPF                Urine Cx:

## 2019-11-07 NOTE — PROGRESS NOTE ADULT - ASSESSMENT
A/P: 74y Female s/p R PCN placement by Interventional Radiology for emphysematous pyelitis Day #4  ecoli bacteremia   - off pressors and insulin drip   continue Abx  - f/u cultures  - OOB  - diabetic diet  -monitor FS glucose control    - endo following   - transfer to floor per SICU

## 2019-11-07 NOTE — PROGRESS NOTE ADULT - SUBJECTIVE AND OBJECTIVE BOX
Interventional Radiology Follow- Up Note    This is a 74y Female s/p right percutaneous nephrostomy on 11/3/19 in Interventional Radiology with Dr. Gusman.     Patient seen and examined @ bedside in 8ICU. Denies abdominal pain, flank pain.     PAST MEDICAL & SURGICAL HISTORY:  HLD (hyperlipidemia)  DM (diabetes mellitus)  No significant past surgical history    Allergies: No Known Allergies    LABS:                        10.4   6.56  )-----------( 224      ( 07 Nov 2019 00:32 )             33.4     11-07    141  |  107  |  6<L>  ----------------------------<  235<H>  4.1   |  23  |  0.80    Ca    9.2      07 Nov 2019 00:32  Phos  3.9     11-07  Mg     1.8     11-07    Vitals: T(F): 98.6 (11-07-19 @ 11:00), Max: 98.8 (11-06-19 @ 15:00)  HR: 95 (11-07-19 @ 12:00) (90 - 112)  BP: 99/56 (11-07-19 @ 11:00) (99/56 - 140/66)  RR: 35 (11-07-19 @ 12:00) (15 - 36)  SpO2: 98% (11-07-19 @ 12:00) (92% - 100%)    PHYSICAL EXAM:  General: Nontoxic, in NAD, A&Ox3  Abd: ND, soft, NTTP. right nephrostomy intact to bag with clear yellow urine. Drain flushed with 5cc NS w/o difficulty. Dressing c/d/i    A/P: 74y Female with right emphysematous pyeonephrlitis s/p nephrostomy on 11/3/19 with Dr. Sow.    -Patient remains afebrile , wbc wnl  -Remains off pressors  -continue to monitor output    -flush drain per doctor orders  -trend vitals, labs  -Continue abx therapy   -Please call IR at extension [phone] with any questions, concerns, or issues regarding above.

## 2019-11-07 NOTE — MEDICAL STUDENT PROGRESS NOTE(EDUCATION) - SUBJECTIVE AND OBJECTIVE BOX
74 year old female with PMHX of Type II diabetes managed by metformin rxed in [place], D, and R emphysematous pyelonephritis for which she underwent a R nephrostomy tube placement on 11/3. Her course was complicated by hypotension requiring pressors, septic shock involving e.coli bacteremia, and HHS vs. DKA.    24 HOUR EVENTS    -bedtime lantus adjusted to 34 units and pre-meal insulin to 16 units    Neuro  -A&Ox4   -acetaminophen 650 mg oral PRN    Respiratory  -CTA bilaterally, no labored breathing noted  RR: 35 (07 Nov 2019 12:00) (16 - 36)  SpO2: 98% (07 Nov 2019 12:00) (92% - 100%)    CV  -RRR, Normal S1, S2. No murmurs, rubs or gallops.    HR: 95 (07 Nov 2019 12:00) (91 - 112)  BP: 99/56 (07 Nov 2019 11:00) (99/56 - 140/66)  BP(mean): 70 (07 Nov 2019 11:00) (70 - 94)  ABP: --  ABP(mean): --    GI  -soft, NT, ND, R nephrostomy tube with minimal yellow urine output. Incision site C/D/I.  diet- lacto ovo vegetarian diet  meds- polyethylene glycol 17 g oral PRN  senna 2 tablets oral     -  I&O's Detail    06 Nov 2019 07:01  -  07 Nov 2019 07:00  --------------------------------------------------------  IN:    Instillation: 5 mL    Oral Fluid: 100 mL  Total IN: 105 mL    OUT:    Nephrostomy Tube: 250 mL    Voided: 1550 mL  Total OUT: 1800 mL    Total NET: -1695 mL    11-07    141  |  107  |  6<L>  ----------------------------<  235<H>  4.1   |  23  |  0.80    Ca    9.2      07 Nov 2019 00:32  Phos  3.9     11-07  Mg     1.8     11-07    Potassium acid phosphate/sodium acid phosphate tablet oral 4x daily    Heme    Hemoglobin : 10.4 g/dL  Hematocrit : 33.4 %  Platelet Count - Automated : 224 K/uL  Mean Cell Volume : 90.0 fl  Mean Cell Hemoglobin : 28.0 pg  Mean Cell Hemoglobin Concentration : 31.1 gm/dL    enoxaparin injectable 40 mg     ID    WBC Count : 6.56 K/uL    T(C): 37 (07 Nov 2019 11:00), Max: 37.1 (06 Nov 2019 15:00)  T(F): 98.6 (07 Nov 2019 11:00), Max: 98.8 (06 Nov 2019 15:00)    ceftriaxone 2000 mg IV    Endo    CAPILLARY BLOOD GLUCOSE      POCT Blood Glucose.: 201 mg/dL (07 Nov 2019 11:35)  POCT Blood Glucose.: 161 mg/dL (07 Nov 2019 08:21)  POCT Blood Glucose.: 215 mg/dL (06 Nov 2019 23:55)  POCT Blood Glucose.: 269 mg/dL (06 Nov 2019 21:13)  POCT Blood Glucose.: 211 mg/dL (06 Nov 2019 17:13)    atorvastatin 10 mg oral daily  insulin glargine LANTUS 34 units bedtime  insulin lispro HUMALOG 16 units 3x daily (4 units last night)  insulin lispro HUMALOG corrective regimen sliding scale    Assessment:    Pt is a 72 yo female with DM II and HLD, presenting with R flank pain and hyperglycemia with dx of R emphysematous pyelonephritis and HHS vs. DKA. Pt is POD 4 R nephrostomy tube placement. She is recovering appropriately in ICU. Stable for transfer to floor today.    Neuro     -monitor mental status  -pain control as needed with acetaminophen (no more than 2 grams daily in the setting of cirrhosis)    Resp    -no active issues  -monitor pulse ox  -OOB and IS    CV - septic shock (resolved)  -Monitor VS    GI  -no active issues  -regular diet as tolerated  -bowel regimen with senna and miralax    Renal- possible CKD stage II, R emphysematous pyelonephritis, POD 4 R nephrostomy tube placement  -monitor I&Os  -monitor electrolytes and replete as necessary  -monitor R nephrostomy tube output    Heme- normocytic anemia   -monitor CBC and coags  -lovenox for VTE ppx    ID - e.coli bacteremia, R emphysematous pyelonephritis  -monitor WBC, temp, and procalcitonin  -blood and urine cultures 11/3 positive for e.coli. Cultures since 11/4 showed no growth  -14 day course of ceftriaxone for E.coli bacteremia    Endo- DM II, HHS vs. DKA (resolved)  -Lantus 34 units at bedtime with Humalog 16 units pre-meal  -endocrine recommendation appreciated    Disposition   -stable for transfer to floor today     Neuro

## 2019-11-07 NOTE — PROGRESS NOTE ADULT - SUBJECTIVE AND OBJECTIVE BOX
Chief Complaint: right flank pain, R emphysematous pyelitis  Consult Reason: diabetes management    Interval History:  Patient seen and examined at bedside this morning; patient denied  service, requested son-in-law as  for discussion. She states that she feels well this morning with no complaints. She denies any pain and also denies headache, lightheadedness, fever/chills, chest pain, abdominal pain, nausea/vomiting, diarrhea/constipation, polyuria/polydipsia, dysuria. Patient has been urinating without issue.    MEDICATIONS  (STANDING):  atorvastatin 10 milliGRAM(s) Oral at bedtime  cefTRIAXone   IVPB 2000 milliGRAM(s) IV Intermittent every 24 hours  chlorhexidine 2% Cloths 1 Application(s) Topical <User Schedule>  enoxaparin Injectable 40 milliGRAM(s) SubCutaneous daily  insulin glargine Injectable (LANTUS) 34 Unit(s) SubCutaneous at bedtime  insulin lispro (HumaLOG) corrective regimen sliding scale   SubCutaneous three times a day before meals  insulin lispro (HumaLOG) corrective regimen sliding scale   SubCutaneous at bedtime  insulin lispro Injectable (HumaLOG) 16 Unit(s) SubCutaneous three times a day before meals  senna 2 Tablet(s) Oral at bedtime    MEDICATIONS  (PRN):  acetaminophen   Tablet .. 650 milliGRAM(s) Oral every 8 hours PRN Mild Pain (1 - 3)  polyethylene glycol 3350 17 Gram(s) Oral every 24 hours PRN Constipation      Allergies    No Known Allergies    Intolerances  None known        PHYSICAL EXAM:  VITALS: T(C): 36.9 (11-07-19 @ 07:00)  T(F): 98.4 (11-07-19 @ 07:00), Max: 98.8 (11-06-19 @ 15:00)  HR: 96 (11-07-19 @ 09:00) (90 - 112)  BP: 126/64 (11-07-19 @ 09:00) (100/60 - 140/66)  RR:  (15 - 36)  SpO2:  (92% - 100%)  Wt(kg): --    GENERAL: NAD, A+Ox3, sitting in chair comfortably  HEENT:  Atraumatic, Normocephalic  RESPIRATORY: Clear to auscultation bilaterally; No rales, rhonchi, wheezing, or rubs  CARDIOVASCULAR: Regular rate and rhythm; No murmurs; no peripheral edema  GI: Soft, nontender, non distended, normal bowel sounds; righ nephrostomy tube with site C/D/I, clear drainage to leg bag    POCT Blood Glucose.: 161 mg/dL (11-07-19 @ 08:21)  POCT Blood Glucose.: 215 mg/dL (11-06-19 @ 23:55)  POCT Blood Glucose.: 269 mg/dL (11-06-19 @ 21:13)  POCT Blood Glucose.: 211 mg/dL (11-06-19 @ 17:13)  POCT Blood Glucose.: 196 mg/dL (11-06-19 @ 11:18)  POCT Blood Glucose.: 161 mg/dL (11-06-19 @ 08:20)  POCT Blood Glucose.: 273 mg/dL (11-05-19 @ 21:03)  POCT Blood Glucose.: 176 mg/dL (11-05-19 @ 17:30)  POCT Blood Glucose.: 226 mg/dL (11-05-19 @ 11:57)  POCT Blood Glucose.: 143 mg/dL (11-05-19 @ 08:11)  POCT Blood Glucose.: 113 mg/dL (11-05-19 @ 06:52)  POCT Blood Glucose.: 97 mg/dL (11-05-19 @ 06:03)  POCT Blood Glucose.: 109 mg/dL (11-05-19 @ 05:01)  POCT Blood Glucose.: 123 mg/dL (11-05-19 @ 04:08)  POCT Blood Glucose.: 131 mg/dL (11-05-19 @ 03:00)  POCT Blood Glucose.: 151 mg/dL (11-05-19 @ 02:06)  POCT Blood Glucose.: 186 mg/dL (11-05-19 @ 01:09)  POCT Blood Glucose.: 232 mg/dL (11-05-19 @ 00:03)  POCT Blood Glucose.: 238 mg/dL (11-04-19 @ 23:01)  POCT Blood Glucose.: 294 mg/dL (11-04-19 @ 21:58)  POCT Blood Glucose.: 317 mg/dL (11-04-19 @ 21:06)  POCT Blood Glucose.: 275 mg/dL (11-04-19 @ 15:47)  POCT Blood Glucose.: 233 mg/dL (11-04-19 @ 11:58)  POCT Blood Glucose.: 232 mg/dL (11-04-19 @ 11:04)      11-07    141  |  107  |  6<L>  ----------------------------<  235<H>  4.1   |  23  |  0.80    EGFR if : 85  EGFR if non : 73    Ca    9.2      11-07  Mg     1.8     11-07  Phos  3.9     11-07      Thyroid Function Tests:  11-03 @ 10:42 TSH 0.23 FreeT4 -- T3 -- Anti TPO -- Anti Thyroglobulin Ab -- TSI --      Hemoglobin A1C, Whole Blood: 10.6 % <H> [4.0 - 5.6] (11-03-19 @ 14:28)

## 2019-11-08 ENCOUNTER — TRANSCRIPTION ENCOUNTER (OUTPATIENT)
Age: 74
End: 2019-11-08

## 2019-11-08 VITALS
OXYGEN SATURATION: 97 % | TEMPERATURE: 98 F | DIASTOLIC BLOOD PRESSURE: 81 MMHG | HEART RATE: 98 BPM | SYSTOLIC BLOOD PRESSURE: 123 MMHG | RESPIRATION RATE: 18 BRPM

## 2019-11-08 LAB
CULTURE RESULTS: SIGNIFICANT CHANGE UP
CULTURE RESULTS: SIGNIFICANT CHANGE UP
GLUCOSE BLDC GLUCOMTR-MCNC: 130 MG/DL — HIGH (ref 70–99)
GLUCOSE BLDC GLUCOMTR-MCNC: 153 MG/DL — HIGH (ref 70–99)
GLUCOSE BLDC GLUCOMTR-MCNC: 160 MG/DL — HIGH (ref 70–99)
GLUCOSE BLDC GLUCOMTR-MCNC: 247 MG/DL — HIGH (ref 70–99)
SPECIMEN SOURCE: SIGNIFICANT CHANGE UP
SPECIMEN SOURCE: SIGNIFICANT CHANGE UP

## 2019-11-08 PROCEDURE — 85027 COMPLETE CBC AUTOMATED: CPT

## 2019-11-08 PROCEDURE — 86850 RBC ANTIBODY SCREEN: CPT

## 2019-11-08 PROCEDURE — 96374 THER/PROPH/DIAG INJ IV PUSH: CPT

## 2019-11-08 PROCEDURE — 83735 ASSAY OF MAGNESIUM: CPT

## 2019-11-08 PROCEDURE — 93306 TTE W/DOPPLER COMPLETE: CPT

## 2019-11-08 PROCEDURE — 87186 SC STD MICRODIL/AGAR DIL: CPT

## 2019-11-08 PROCEDURE — C1729: CPT

## 2019-11-08 PROCEDURE — 86901 BLOOD TYPING SEROLOGIC RH(D): CPT

## 2019-11-08 PROCEDURE — 97116 GAIT TRAINING THERAPY: CPT

## 2019-11-08 PROCEDURE — 84295 ASSAY OF SERUM SODIUM: CPT

## 2019-11-08 PROCEDURE — C1894: CPT

## 2019-11-08 PROCEDURE — 85730 THROMBOPLASTIN TIME PARTIAL: CPT

## 2019-11-08 PROCEDURE — 82010 KETONE BODYS QUAN: CPT

## 2019-11-08 PROCEDURE — 82330 ASSAY OF CALCIUM: CPT

## 2019-11-08 PROCEDURE — 80053 COMPREHEN METABOLIC PANEL: CPT

## 2019-11-08 PROCEDURE — 82565 ASSAY OF CREATININE: CPT

## 2019-11-08 PROCEDURE — 86900 BLOOD TYPING SEROLOGIC ABO: CPT

## 2019-11-08 PROCEDURE — 84132 ASSAY OF SERUM POTASSIUM: CPT

## 2019-11-08 PROCEDURE — 85014 HEMATOCRIT: CPT

## 2019-11-08 PROCEDURE — 50433 PLMT NEPHROURETERAL CATHETER: CPT

## 2019-11-08 PROCEDURE — 83605 ASSAY OF LACTIC ACID: CPT

## 2019-11-08 PROCEDURE — 82803 BLOOD GASES ANY COMBINATION: CPT

## 2019-11-08 PROCEDURE — 82435 ASSAY OF BLOOD CHLORIDE: CPT

## 2019-11-08 PROCEDURE — 99233 SBSQ HOSP IP/OBS HIGH 50: CPT

## 2019-11-08 PROCEDURE — 87086 URINE CULTURE/COLONY COUNT: CPT

## 2019-11-08 PROCEDURE — 97530 THERAPEUTIC ACTIVITIES: CPT

## 2019-11-08 PROCEDURE — 80048 BASIC METABOLIC PNL TOTAL CA: CPT

## 2019-11-08 PROCEDURE — C1769: CPT

## 2019-11-08 PROCEDURE — 87040 BLOOD CULTURE FOR BACTERIA: CPT

## 2019-11-08 PROCEDURE — 82947 ASSAY GLUCOSE BLOOD QUANT: CPT

## 2019-11-08 PROCEDURE — 82962 GLUCOSE BLOOD TEST: CPT

## 2019-11-08 PROCEDURE — 84145 PROCALCITONIN (PCT): CPT

## 2019-11-08 PROCEDURE — 85610 PROTHROMBIN TIME: CPT

## 2019-11-08 PROCEDURE — 99291 CRITICAL CARE FIRST HOUR: CPT | Mod: 25

## 2019-11-08 PROCEDURE — 80074 ACUTE HEPATITIS PANEL: CPT

## 2019-11-08 PROCEDURE — 99231 SBSQ HOSP IP/OBS SF/LOW 25: CPT

## 2019-11-08 PROCEDURE — 97163 PT EVAL HIGH COMPLEX 45 MIN: CPT

## 2019-11-08 PROCEDURE — 84100 ASSAY OF PHOSPHORUS: CPT

## 2019-11-08 RX ORDER — ATORVASTATIN CALCIUM 80 MG/1
1 TABLET, FILM COATED ORAL
Qty: 0 | Refills: 0 | DISCHARGE
Start: 2019-11-08

## 2019-11-08 RX ORDER — SENNA PLUS 8.6 MG/1
2 TABLET ORAL
Qty: 0 | Refills: 0 | DISCHARGE
Start: 2019-11-08

## 2019-11-08 RX ORDER — ACETAMINOPHEN 500 MG
2 TABLET ORAL
Qty: 0 | Refills: 0 | DISCHARGE
Start: 2019-11-08

## 2019-11-08 RX ORDER — ENOXAPARIN SODIUM 100 MG/ML
30 INJECTION SUBCUTANEOUS
Qty: 90 | Refills: 0
Start: 2019-11-08 | End: 2019-12-07

## 2019-11-08 RX ORDER — INSULIN LISPRO 100/ML
16 VIAL (ML) SUBCUTANEOUS
Qty: 5 | Refills: 0
Start: 2019-11-08 | End: 2019-12-07

## 2019-11-08 RX ORDER — ATORVASTATIN CALCIUM 80 MG/1
1 TABLET, FILM COATED ORAL
Qty: 30 | Refills: 0
Start: 2019-11-08

## 2019-11-08 RX ORDER — METFORMIN HYDROCHLORIDE 850 MG/1
1 TABLET ORAL
Qty: 60 | Refills: 0
Start: 2019-11-08

## 2019-11-08 RX ORDER — METFORMIN HYDROCHLORIDE 850 MG/1
1 TABLET ORAL
Qty: 0 | Refills: 0 | DISCHARGE

## 2019-11-08 RX ORDER — AZTREONAM 2 G
160 VIAL (EA) INJECTION
Qty: 3520 | Refills: 0
Start: 2019-11-08 | End: 2019-11-18

## 2019-11-08 RX ORDER — INSULIN GLARGINE 100 [IU]/ML
30 INJECTION, SOLUTION SUBCUTANEOUS AT BEDTIME
Refills: 0 | Status: DISCONTINUED | OUTPATIENT
Start: 2019-11-08 | End: 2019-11-08

## 2019-11-08 RX ORDER — INSULIN LISPRO 100/ML
18 VIAL (ML) SUBCUTANEOUS
Qty: 6 | Refills: 0
Start: 2019-11-08 | End: 2019-12-07

## 2019-11-08 RX ORDER — ENOXAPARIN SODIUM 100 MG/ML
30 INJECTION SUBCUTANEOUS
Qty: 3 | Refills: 0
Start: 2019-11-08 | End: 2019-12-07

## 2019-11-08 RX ADMIN — ENOXAPARIN SODIUM 40 MILLIGRAM(S): 100 INJECTION SUBCUTANEOUS at 12:59

## 2019-11-08 RX ADMIN — Medication 18 UNIT(S): at 08:13

## 2019-11-08 RX ADMIN — Medication 18 UNIT(S): at 12:59

## 2019-11-08 RX ADMIN — ATORVASTATIN CALCIUM 10 MILLIGRAM(S): 80 TABLET, FILM COATED ORAL at 22:30

## 2019-11-08 RX ADMIN — Medication 2: at 17:09

## 2019-11-08 RX ADMIN — CHLORHEXIDINE GLUCONATE 1 APPLICATION(S): 213 SOLUTION TOPICAL at 05:09

## 2019-11-08 RX ADMIN — SENNA PLUS 2 TABLET(S): 8.6 TABLET ORAL at 22:30

## 2019-11-08 RX ADMIN — Medication 2: at 08:13

## 2019-11-08 RX ADMIN — INSULIN GLARGINE 30 UNIT(S): 100 INJECTION, SOLUTION SUBCUTANEOUS at 22:27

## 2019-11-08 RX ADMIN — Medication 18 UNIT(S): at 17:09

## 2019-11-08 RX ADMIN — CEFTRIAXONE 100 MILLIGRAM(S): 500 INJECTION, POWDER, FOR SOLUTION INTRAMUSCULAR; INTRAVENOUS at 01:00

## 2019-11-08 NOTE — DISCHARGE NOTE PROVIDER - NSDCCPCAREPLAN_GEN_ALL_CORE_FT
PRINCIPAL DISCHARGE DIAGNOSIS  Diagnosis: Pyelitis  Assessment and Plan of Treatment: Take the antibiotics as prescribed.  Your nephrostomy tube bag has to be emptied when it gets full.  Follow up with Dr Mcdonnell within 2 weeks to discuss further management for the nephrostomy tube.      SECONDARY DISCHARGE DIAGNOSES  Diagnosis: Type 2 diabetes mellitus without complication, without long-term current use of insulin  Assessment and Plan of Treatment: You were started on Lantus to help control your sugars and were taught how to use it in the hospital.  Take the prescribed dose once a day as an injection.  Check your sugars regularly, and follow up with the endocrinologist.    Diagnosis: HLD (hyperlipidemia)  Assessment and Plan of Treatment: Continue atorvastatin??? PRINCIPAL DISCHARGE DIAGNOSIS  Diagnosis: Pyelitis  Assessment and Plan of Treatment: Take the antibiotics as prescribed.  Your nephrostomy tube bag has to be emptied when it gets full.  Follow up with Dr Mcdonnell within 2 weeks to discuss further management of the nephrostomy tube.  If he does not take your insurance, follow up in the Urology clinic at [facility].  Call [phone].  It is very important that you follow up for the nephrostomy tube.      SECONDARY DISCHARGE DIAGNOSES  Diagnosis: Type 2 diabetes mellitus without complication, without long-term current use of insulin  Assessment and Plan of Treatment: You were started on Lantus to help control your sugars and were taught how to use it in the hospital.  Take the prescribed dose once a day as an injection.  Check your sugars regularly, and follow up with the endocrinologist.  You can follow up in the Endocrinology clinic ###    Diagnosis: HTN (hypertension)  Assessment and Plan of Treatment:     Diagnosis: HLD (hyperlipidemia)  Assessment and Plan of Treatment: Continue atorvastatin PRINCIPAL DISCHARGE DIAGNOSIS  Diagnosis: Pyelitis  Assessment and Plan of Treatment: Take the antibiotics as prescribed.  Your nephrostomy tube bag has to be emptied when it gets full.  Follow up with Dr Mcdonnell within 1 weeks to discuss further management of the nephrostomy tube.  If he does not take your insurance, follow up in the Urology clinic at [facility].  Call [phone].  It is very important that you follow up for the nephrostomy tube.      SECONDARY DISCHARGE DIAGNOSES  Diagnosis: Type 2 diabetes mellitus without complication, without long-term current use of insulin  Assessment and Plan of Treatment: You were started on Lantus to help control your sugars and were taught how to use it in the hospital.  Take the prescribed dose once a day as an injection.  Check your sugars regularly, and follow up with the endocrinologist.  You can follow up in the Endocrinology clinic ###    Diagnosis: HTN (hypertension)  Assessment and Plan of Treatment:     Diagnosis: HLD (hyperlipidemia)  Assessment and Plan of Treatment: Continue atorvastatin PRINCIPAL DISCHARGE DIAGNOSIS  Diagnosis: Pyelitis  Assessment and Plan of Treatment: Take the antibiotics as prescribed.  Your nephrostomy tube bag has to be emptied when it gets full.  Follow up with Dr Mcdonnell within 1 weeks to discuss further management of the nephrostomy tube.  If he does not take your insurance, follow up in the Urology clinic at [facility].  Call [phone].  It is very important that you follow up for the nephrostomy tube.      SECONDARY DISCHARGE DIAGNOSES  Diagnosis: Type 2 diabetes mellitus without complication, without long-term current use of insulin  Assessment and Plan of Treatment: You were started on Lantus and Humalog to help control your sugars and were taught how to use it in the hospital.  Take the prescribed dose once a day as an injection.  Check your sugars 4 times a day (before meals and at bedtime), and follow up in the medicine clinic in 1 month.  After you follow up in that clinic, follow up in the endocrinology clinic the next month.  Increase your metformin from 500mg to 1000mg twice a day.  If you have 500mg tablets at home, take 2 instead of 1 pill.    Diagnosis: HTN (hypertension)  Assessment and Plan of Treatment:     Diagnosis: HLD (hyperlipidemia)  Assessment and Plan of Treatment: Continue atorvastatin PRINCIPAL DISCHARGE DIAGNOSIS  Diagnosis: Pyelitis  Assessment and Plan of Treatment: Take the antibiotics as prescribed.  Your nephrostomy tube bag has to be emptied when it gets full.  Follow up with Dr Mcdonnell within 1 weeks to discuss further management of the nephrostomy tube.  If he does not take your insurance, follow up in the Urology clinic at [facility].  Call [phone].  It is very important that you follow up for the nephrostomy tube.      SECONDARY DISCHARGE DIAGNOSES  Diagnosis: Type 2 diabetes mellitus without complication, without long-term current use of insulin  Assessment and Plan of Treatment: You were started on Lantus and Humalog to help control your sugars and were taught how to use it in the hospital.  Take the prescribed dose once a day as an injection.  Check your sugars 4 times a day (before meals and at bedtime), and follow up in the endocrinology clinic on 11/14 at [address] [place] [address], 3rd floor, [facility].  Increase your metformin from 500mg to 1000mg twice a day.  If you have 500mg tablets at home, take 2 instead of 1 pill.    Diagnosis: HTN (hypertension)  Assessment and Plan of Treatment:     Diagnosis: HLD (hyperlipidemia)  Assessment and Plan of Treatment: Continue atorvastatin PRINCIPAL DISCHARGE DIAGNOSIS  Diagnosis: Pyelitis  Assessment and Plan of Treatment: Take the antibiotics as prescribed.  Your nephrostomy tube bag has to be emptied when it gets full.  Follow up with Dr Mcdonnell within 1 weeks to discuss further management of the nephrostomy tube.  If he does not take your insurance, follow up in the Urology clinic at [facility].  Call [phone].  It is very important that you follow up for the nephrostomy tube.      SECONDARY DISCHARGE DIAGNOSES  Diagnosis: Type 2 diabetes mellitus without complication, without long-term current use of insulin  Assessment and Plan of Treatment: You were started on Lantus and Humalog to help control your sugars and were taught how to use it in the hospital.  Take the prescribed dose as directed.  Check your sugars 4 times a day (before meals and at bedtime), and follow up in the endocrinology clinic on 11/14/19 at [address] [place] [address], 3rd [facility], [facility].  The number to this clinic is [phone]  Increase your metformin from 500mg to 1000mg twice a day.  If you have 500mg tablets at home, take 2 instead of 1 pill.    Diagnosis: HTN (hypertension)  Assessment and Plan of Treatment:     Diagnosis: HLD (hyperlipidemia)  Assessment and Plan of Treatment: Continue atorvastatin PRINCIPAL DISCHARGE DIAGNOSIS  Diagnosis: Pyelitis  Assessment and Plan of Treatment: Take the antibiotics as prescribed.  Your nephrostomy tube bag has to be emptied when it gets full.  Follow up with Dr Mcdonnell within 1 week to discuss further management of the nephrostomy tube.  It is very important that you follow up for the nephrostomy tube.      SECONDARY DISCHARGE DIAGNOSES  Diagnosis: Type 2 diabetes mellitus without complication, without long-term current use of insulin  Assessment and Plan of Treatment: You were started on Lantus and Humalog to help control your sugars and were taught how to use it in the hospital.  Take the prescribed dose as directed.  Check your sugars 4 times a day (before meals and at bedtime), and follow up in the endocrinology clinic on 11/14/19 at [address] [place] [address]  The number to this clinic is [phone]  Increase your metformin from 500mg to 1000mg twice a day.  If you have 500mg tablets at home, take 2 instead of 1 pill.    Diagnosis: HTN (hypertension)  Assessment and Plan of Treatment:     Diagnosis: HLD (hyperlipidemia)  Assessment and Plan of Treatment: Continue atorvastatin

## 2019-11-08 NOTE — PROGRESS NOTE ADULT - ASSESSMENT
75 y/o F w/h/o uncontrolled T2DM/HLD. Here with hyperglycemia, sepsis found to have emphysematous pyelitis on antibiotics. Feeling better and tolerating meals. No pain. Glycemic control improved while on present insulin doses. Noted drop of glucose from bed time 203 to 153 this am. No hypoglycemia and per team pt for discharge home. Spoke to staff and team about the need to teach pt how to inject insulin or prepare insulin with an insulin pen prior to discharge.    Met with patient and reviewed the following:  -A1c LEVEL: Present and goal  -Blood glucose goals: In 100s   -Glucose monitoring frequency: ac and hs  -Insulin(s) action, time of administration and side effects Basal/bolus  -Importance of follow up care. Need apt for medicine clinic and endo clinic. Also needs to see Opthalmologist and dentist.   Pt verbalized understanding and all questions were answered.   Spent over 30 minutes providing face to face education.

## 2019-11-08 NOTE — PROGRESS NOTE ADULT - SUBJECTIVE AND OBJECTIVE BOX
DIABETES FOLLOW UP NOTE: Saw pt earlier today  INTERVAL HX: 75 y/o F w/h/o uncontrolled T2DM while on Metformin. Also HLD. Here with hyperglycemia, sepsis found to have emphysematous pyelitis on antibiotics. Used official Tamil > Gopi # [id number]. Per pt feeling better and tolerating meals. No pain. Glycemic control improved today after insulin doses where adjusted yesterday. No hypoglycemia and per team pt for discharge home. However, pt has not been taught how to inject insulin or prepare insulin with an insulin pen yet.  Doesn't have glucose meter as well,       Review of Systems:  General: As above  Cardiovascular: No chest pain, palpitations  Respiratory: No SOB, no cough  GI: No nausea, vomiting, abdominal pain  Endocrine: no polyuria, polydipsia or S&Sx of hypoglycemia    Allergies    No Known Allergies    Intolerances      MEDICATIONS:  atorvastatin 10 milliGRAM(s) Oral at bedtime  cefTRIAXone   IVPB 2000 milliGRAM(s) IV Intermittent every 24 hours  insulin glargine Injectable (LANTUS) 34 Unit(s) SubCutaneous at bedtime  insulin lispro (HumaLOG) corrective regimen sliding scale   SubCutaneous three times a day before meals  insulin lispro (HumaLOG) corrective regimen sliding scale   SubCutaneous at bedtime  insulin lispro Injectable (HumaLOG) 18 Unit(s) SubCutaneous three times a day before meals      PHYSICAL EXAM:  VITALS: T(C): 36.6 (11-08-19 @ 10:48)  T(F): 97.8 (11-08-19 @ 10:48), Max: 98.5 (11-07-19 @ 17:00)  HR: 98 (11-08-19 @ 10:48) (90 - 105)  BP: 112/65 (11-08-19 @ 10:48) (97/56 - 124/74)  RR:  (16 - 35)  SpO2:  (94% - 99%)  Wt(kg): --  GENERAL: Female sitting in chair in NAD  Abdomen: Soft, nontender, non distended  Extremities: Warm, no edema in all 4 exts  NEURO: A&O X3    LABS:  POCT Blood Glucose.: 130 mg/dL (11-08-19 @ 12:56)  POCT Blood Glucose.: 153 mg/dL (11-08-19 @ 07:45)  POCT Blood Glucose.: 203 mg/dL (11-07-19 @ 21:13)  POCT Blood Glucose.: 117 mg/dL (11-07-19 @ 18:22)  POCT Blood Glucose.: 282 mg/dL (11-07-19 @ 16:13)  POCT Blood Glucose.: 201 mg/dL (11-07-19 @ 11:35)  POCT Blood Glucose.: 161 mg/dL (11-07-19 @ 08:21)  POCT Blood Glucose.: 215 mg/dL (11-06-19 @ 23:55)  POCT Blood Glucose.: 269 mg/dL (11-06-19 @ 21:13)  POCT Blood Glucose.: 211 mg/dL (11-06-19 @ 17:13)  POCT Blood Glucose.: 196 mg/dL (11-06-19 @ 11:18)  POCT Blood Glucose.: 161 mg/dL (11-06-19 @ 08:20)  POCT Blood Glucose.: 273 mg/dL (11-05-19 @ 21:03)  POCT Blood Glucose.: 176 mg/dL (11-05-19 @ 17:30)                                    10.4   6.56  )-----------( 224      ( 07 Nov 2019 00:32 )             33.4       11-07    141  |  107  |  6<L>  ----------------------------<  235<H>  4.1   |  23  |  0.80    EGFR if : 85  EGFR if non : 73    Ca    9.2      11-07  Mg     1.8     11-07  Phos  3.9     11-07        Thyroid Function Tests:  11-03 @ 10:42 TSH 0.23 FreeT4 -- T3 -- Anti TPO -- Anti Thyroglobulin Ab -- TSI --      Hemoglobin A1C, Whole Blood: 10.6 % <H> [4.0 - 5.6] (11-03-19 @ 14:28)      11-03 Chol 130 LDL 62 HDL 45<L> Trig 114

## 2019-11-08 NOTE — DISCHARGE NOTE PROVIDER - PROVIDER TOKENS
PROVIDER:[TOKEN:[1991:MIIS:1991],FOLLOWUP:[2 weeks]] PROVIDER:[TOKEN:[1991:MIIS:1991],FOLLOWUP:[1 week]] PROVIDER:[TOKEN:[1991:MIIS:1991],FOLLOWUP:[1 week]],FREE:[LAST:[Clinic],FIRST:[Urology],PHONE:[phone]],FAX:[(   )    -],ADDRESS:[[address] [place] [address], [address] [place] [address], [facility]],FOLLOWUP:[1 week]] PROVIDER:[TOKEN:[1991:MIIS:1991],FOLLOWUP:[1 week]],FREE:[LAST:[Clinic],FIRST:[Endocrinology],PHONE:[phone]:[(   )    -],ADDRESS:[[address] [place] [address], [address] [place] [address], [facility]],SCHEDULEDAPPT:[11/14/2019],SCHEDULEDAPPTTIME:[09:30 AM]]

## 2019-11-08 NOTE — PROGRESS NOTE ADULT - SUBJECTIVE AND OBJECTIVE BOX
UROLOGY DAILY PROGRESS NOTE:     Subjective: Patient seen and examined at bedside. Transferred from SICU    No acute events overnight.   Endocrine recommendations appreciated     Objective:  Vital signs  T(F): , Max: 98.6 (19 @ 11:00)  HR: 90 (19 @ 05:12)  BP: 102/70 (19 @ 05:12)  SpO2: 98% (19 @ 05:12)  Wt(kg): --    Output     I&O's Detail    2019 07:01  -  2019 07:00  --------------------------------------------------------  IN:    IV PiggyBack: 350 mL    Oral Fluid: 500 mL  Total IN: 850 mL    OUT:    Incontinent per Collection Ba mL    Nephrostomy Tube: 160 mL    Voided: 1250 mL  Total OUT: 1910 mL    Total NET: -1060 mL          Physical Exam:  Gen: NAD  Abd: soft NTND   Back: R nephrostomy in place   : voiding     Labs:    6.56  / 33.4  /0.80                           10.4   6.56  )-----------( 224      ( 2019 00:32 )             33.4         141  |  107  |  6<L>  ----------------------------<  235<H>  4.1   |  23  |  0.80    Ca    9.2      2019 00:32  Phos  3.9     11-07  Mg     1.8         LABS/RADIOLOGY RESULTS:                          10.4   6.56  )-----------( 224      ( 2019 00:32 )             33.4       141  |  107  |  6<L>  ----------------------------<  235<H>  4.1   |  23  |  0.80    Ca    9.2      2019 00:32  Phos  3.9     11-07  Mg     1.8         Blood Cultures    Urinalysis Basic - ( 2019 02:54 )    Color: Yellow / Appearance: Clear / S.010 / pH:   Gluc:  / Ketone: Moderate  / Bili: Negative / Urobili: Negative   Blood:  / Protein: 30 mg/dL / Nitrite: Negative   Leuk Esterase: Small / RBC: >50 /HPF / WBC 3-5 /HPF   Sq Epi:  / Non Sq Epi: Occasional /HPF / Bacteria: Negative /HPF                Urine Cx:

## 2019-11-08 NOTE — DISCHARGE NOTE PROVIDER - NSDCMRMEDTOKEN_GEN_ALL_CORE_FT
acetaminophen 325 mg oral tablet: 2 tab(s) orally every 6 hours, As Needed - 3)  senna oral tablet: 2 tab(s) orally once a day (at bedtime) acetaminophen 325 mg oral tablet: 2 tab(s) orally every 6 hours, As Needed - 3)  atorvastatin 10 mg oral tablet: 1 tab(s) orally once a day (at bedtime)  senna oral tablet: 2 tab(s) orally once a day (at bedtime) acetaminophen 325 mg oral tablet: 2 tab(s) orally every 6 hours, As Needed - 3)  atorvastatin 10 mg oral tablet: 1 tab(s) orally once a day (at bedtime)  Bactrim  mg-160 mg oral tablet: 160 milligram(s) orally every 12 hours   glucometer (per patient&#x27;s insurance): Test blood sugars four times a day. Dispense #1 glucometer.  HumaLOG KwikPen 100 units/mL injectable solution: 16 unit(s) subcutaneous 3 times a day (with meals)   Insulin Pen Needles, 4mm: 1 application subcutaneously 4 times a day. ** Use with insulin pen **   Lantus Solostar Pen 100 units/mL subcutaneous solution: 30 unit(s) subcutaneous once a day   metFORMIN 1000 mg oral tablet: 1 tab(s) orally 2 times a day  senna oral tablet: 2 tab(s) orally once a day (at bedtime)  test strips (per patient&#x27;s insurance): 1 application subcutaneously 4 times a day. ** Compatible with patient&#x27;s glucometer ** acetaminophen 325 mg oral tablet: 2 tab(s) orally every 6 hours, As Needed - 3)  atorvastatin 10 mg oral tablet: 1 tab(s) orally once a day (at bedtime)  Bactrim  mg-160 mg oral tablet: 160 milligram(s) orally every 12 hours   glucometer (per patient&#x27;s insurance): Test blood sugars four times a day. Dispense #1 glucometer.  HumaLOG KwikPen 100 units/mL injectable solution: 16 unit(s) subcutaneous 3 times a day (with meals)   Insulin Pen Needles, 4mm: 1 application subcutaneously 4 times a day. ** Use with insulin pen **   lancets: 1 application subcutaneously 4 times a day   Lantus Solostar Pen 100 units/mL subcutaneous solution: 30 unit(s) subcutaneous once a day   metFORMIN 1000 mg oral tablet: 1 tab(s) orally 2 times a day  senna oral tablet: 2 tab(s) orally once a day (at bedtime)  test strips (per patient&#x27;s insurance): 1 application subcutaneously 4 times a day. ** Compatible with patient&#x27;s glucometer **

## 2019-11-08 NOTE — DISCHARGE NOTE PROVIDER - NSDCACTIVITY_GEN_ALL_CORE
No heavy lifting/straining/Walking - Indoors allowed/Showering allowed/Walking - Outdoors allowed/Stairs allowed

## 2019-11-08 NOTE — DISCHARGE NOTE PROVIDER - HOSPITAL COURSE
This is a 74 year old female who was admitted for emphysematous pyelitis and hydronephrosis.  She underwent a right ureteral nephrostomy tube placed urgently that day and stayed in the ICU. This is a 74 year old female who was admitted for right emphysematous pyelitis and hydronephrosis.  She underwent a right ureteral nephrostomy tube placed urgently that day and stayed in the ICU. This is a 74 year old female who was admitted for right emphysematous pyelitis and hydronephrosis.  She underwent a right ureteral nephrostomy tube placed urgently that day and stayed in the ICU.  She was initially requiring vasopressor support but was eventually weaned off.  H/H was stable.  Her blood and urine cultures grew out ecoli, and the repeat blood cultures were negative.  Endocrinology was consulted for diabetes management given her sugars were uncontrolled and she had a HgA1C of 10.  They ultimately recommedned for her to continue her metformin and to start lantus as well as pre-meal humalog.  she was successfully taught how to give herself insulin, and visiting nursing care was set up for her nephrostomy tube.  She was discharged after being educated on the importance of follow up care. This is a 74 year old female who was admitted for right emphysematous pyelitis and hydronephrosis.  She underwent a right ureteral nephrostomy tube placed urgently that day and stayed in the ICU.  She was initially requiring vasopressor support but was eventually weaned off.  H/H was stable.  Her blood and urine cultures grew out ecoli, and the repeat blood cultures were negative.  Endocrinology was consulted for diabetes management given her sugars were uncontrolled and she had a HgA1C of 10.  They ultimately recommended for her to continue her metformin and to start a basal/bolus regimen.  She was successfully taught how to give herself insulin, and visiting nursing care was set up for her nephrostomy tube.  She was discharged after being educated on the importance of follow up care.

## 2019-11-08 NOTE — PROGRESS NOTE ADULT - ASSESSMENT
A/P: 74y Female s/p R PCN placement by Interventional Radiology for emphysematous pyelitis Day #5  ecoli bacteremia    continue Abx  - f/u repeat cultures  - OOB  - diabetic diet  -monitor FS glucose control    - endo following   insulin teaching   - Dc planing with insulin   Bactrim x 14days total

## 2019-11-08 NOTE — DISCHARGE NOTE NURSING/CASE MANAGEMENT/SOCIAL WORK - PATIENT PORTAL LINK FT
You can access the FollowMyHealth Patient Portal offered by  by registering at the following website: [email or web address]. By joining [employer]’s FollowMyHealth portal, you will also be able to view your health information using other applications (apps) compatible with our system.

## 2019-11-08 NOTE — DISCHARGE NOTE PROVIDER - CARE PROVIDER_API CALL
Vic Mcdonnell)  Urology  [address] [facility], [facility] [address]  [place] [address]  Phone: [phone]  Fax: [phone]  Follow Up Time: 2 weeks Vic Mcdonnell)  Urology  [address] [facility], [facility] [address]  [place] [address]  Phone: [phone]  Fax: [phone]  Follow Up Time: 1 week Vic Mcdonnell)  Urology  [address] [facility], [address]  [place] [address]  Phone: [phone]  Fax: [phone]  Follow Up Time: 1 week    Clinic, Urology  [address] [facility], 4th Floor, [facility]  Phone: [phone]  Fax: (   )    -  Follow Up Time: 1 week Vic Mcdonnell)  Urology  [address] [facility], [address]  [place] [address]  Phone: [phone]  Fax: [phone]  Follow Up Time: 1 week    Clinic, Endocrinology  [address] [place] [address], 3rd floor, [facility]  Phone: [phone]  Fax: (   )    -  Scheduled Appointment: 11/14/2019 09:30 AM

## 2019-11-08 NOTE — DISCHARGE NOTE PROVIDER - NSDCHHNEEDSERVICE_GEN_ALL_CORE
Observation and assessment/Ostomy care and management/Other, specify.../Teaching and training Other, specify.../Medication teaching and assessment/Observation and assessment/Ostomy care and management/Teaching and training

## 2019-11-08 NOTE — DISCHARGE NOTE PROVIDER - CARE PROVIDERS DIRECT ADDRESSES
,[email or web address]

## 2019-11-08 NOTE — DISCHARGE NOTE PROVIDER - NSDCHHNEEDSERVICEOTHER_GEN_ALL_CORE_FT
Empty nephrostomy drainage bag regularly, and apply a 4x4 dressing to the nephrostomy site daily. Empty nephrostomy drainage bag regularly, and apply a 4x4 dressing to the nephrostomy site daily.  Also, patient requires teaching reinforcement for administering lantus and humalog.

## 2019-11-08 NOTE — PROGRESS NOTE ADULT - SUBJECTIVE AND OBJECTIVE BOX
Interventional Radiology    S/P Right percutaneous nephrostomy tube placement, POD # 5.      Pt speaks limited english.  Pt denies N/V, or abdominal pain, or flank pain.      WBC count remains normal and pt is afebrile.   Pt was downgraded from 8ICU.      Vital Signs Last 24 Hrs  T(C): 36.7 (08 Nov 2019 05:12), Max: 37 (07 Nov 2019 11:00)  T(F): 98 (08 Nov 2019 05:12), Max: 98.6 (07 Nov 2019 11:00)  HR: 90 (08 Nov 2019 05:12) (90 - 105)  BP: 102/70 (08 Nov 2019 05:12) (97/56 - 124/74)  BP(mean): 75 (07 Nov 2019 16:00) (70 - 75)  RR: 16 (08 Nov 2019 05:12) (16 - 35)  SpO2: 98% (08 Nov 2019 05:12) (94% - 99%)    General Appearance:     Procedure Site (Right flank):     I&O's Detail    OUT:    Right Nephrostomy Tube: 160 mL      LABS:                      10.4   6.56  )-----------( 224      ( 07 Nov 2019 00:32 )             33.4     11-07    141  |  107  |  6<L>  ----------------------------<  235<H>  4.1   |  23  |  0.80    Ca    9.2      07 Nov 2019 00:32  Phos  3.9     11-07  Mg     1.8     11-07      A/P  74y Female with E. Coli Bacteremia, Right emphysematous pyelonephritis S/P Right perc nephrostomy (PCN)    Continue global medical management per Urology.  F/U with Urology    Forward flush the catheter daily as ordered, maintain dressing over the site. monitor and record daily outputs from R PCN.      If the nephrostomy is needed for longer term then it will need routine exchange every 3 months.  The appointment for IR can be arranged by contacting IR scheduling office at [phone].  Pt will benefit from VNS for drainage catheter care (e.g. dressing, flushing).  Case d/w Urology team, DARYA Smallwood.     Espinoza Paul, BRAD-C  Spectralink [id number]  Ext [phone] Interventional Radiology    S/P Right percutaneous nephrostomy tube placement, POD # 5.      Pt speaks limited english.  Pt denies N/V, or abdominal pain, or flank pain.      WBC count remains normal and pt is afebrile.   Pt was downgraded from 8ICU to 9Mon.      Vital Signs Last 24 Hrs  T(C): 36.7 (08 Nov 2019 05:12), Max: 37 (07 Nov 2019 11:00)  T(F): 98 (08 Nov 2019 05:12), Max: 98.6 (07 Nov 2019 11:00)  HR: 90 (08 Nov 2019 05:12) (90 - 105)  BP: 102/70 (08 Nov 2019 05:12) (97/56 - 124/74)  BP(mean): 75 (07 Nov 2019 16:00) (70 - 75)  RR: 16 (08 Nov 2019 05:12) (16 - 35)  SpO2: 98% (08 Nov 2019 05:12) (94% - 99%)    General Appearance: NAD seen in room sitting in chair    Procedure Site (Right flank): Dressing C/D/I, catheter is intact draining to leg bag, flushed with sterile NS without difficulty.  bag clamped to patient's gown    I&O's Detail    OUT:    Right Nephrostomy Tube: 160 mL      LABS:                      10.4   6.56  )-----------( 224      ( 07 Nov 2019 00:32 )             33.4     11-07    141  |  107  |  6<L>  ----------------------------<  235<H>  4.1   |  23  |  0.80    Ca    9.2      07 Nov 2019 00:32  Phos  3.9     11-07  Mg     1.8     11-07      A/P  74y Female with E. Coli Bacteremia, Right emphysematous pyelonephritis S/P Right perc nephrostomy (PCN)    Continue global medical management per Urology.  F/U with Urology    Forward flush the catheter daily as ordered, maintain dressing over the site. monitor and record daily outputs from R PCN.      If the nephrostomy is needed for longer term then it will need routine exchange every 3 months.  The appointment for IR can be arranged by contacting IR scheduling office at [phone].  Pt will benefit from VNS for drainage catheter care (e.g. dressing, flushing).  Case d/w Urology team, DARYA Smallwood.     D/w covering RN to change the patient's clamp to a pin to hold the leg bag to the patient's clothing to prevent the leg bag from causing the catheter to get dislodged.     Espinoza Paul, RPA-C  [facility] [id number]  Ext [phone]

## 2019-11-08 NOTE — PROGRESS NOTE ADULT - PROBLEM SELECTOR PLAN 1
-Test BG ac and hs  -Decrease Lantus dose to 30 units qhs  -Continue Humalog 18 units ac meals  -Continue Humalog moderate correction scale ac and hs  -Upon discharge continue basal/bolus regimen. Consider Lantus 30 plus Humalog 16 ac meals since infection is resolving now.  -Pt needs education regarding insulin injections and use of insulin pens  -Please write Rxs for: Solo Star Insulin pen/Humalog Kwik insulin pen/Maria Victoria insulin pen needles/glucose meter/strips/lancets  -Needs home care to reinforce education regarding glucose monitoring and insulin administration  -Plan discussed with pt/team.  Contact info: [phone] (24/7). pager [phone] -Test BG ac and hs  -Decrease Lantus dose to 30 units qhs  -Continue Humalog 18 units ac meals  -Continue Humalog moderate correction scale ac and hs  -Upon discharge continue basal/bolus regimen. Consider Lantus 30 plus Humalog 16 ac meals since infection is resolving now.  -Change Metformin to 1g bid  -Pt needs education regarding insulin injections and use of insulin pens  -Please write Rxs for: Solo Star Insulin pen/Humalog Kwik insulin pen/Maria Victoria insulin pen needles/glucose meter/strips/lancets  -Needs home care to reinforce education regarding glucose monitoring and insulin administration  -Plan discussed with pt/team.  Contact info: [phone] (24/7). pager [phone] -Test BG ac and hs  -Decrease Lantus dose to 30 units qhs  -Continue Humalog 18 units ac meals  -Continue Humalog moderate correction scale ac and hs  -Upon discharge continue basal/bolus regimen. Consider Lantus 30 plus Humalog 16 ac meals since infection is resolving now.  -Change Metformin to 1g bid  -Pt needs education regarding insulin injections and use of insulin pens  -Please write Rxs for: Solo Star Insulin pen/Humalog Kwik insulin pen/Maria Victoria insulin pen needles/glucose meter/strips/lancets  -Needs home care to reinforce education regarding glucose monitoring and insulin administration  -Has apt in endo clinic for 11/14/19 at 9:30am [address] [address] [address] [facility] pt clinic.  -Plan discussed with pt/team.  Contact info: [phone] (24/7). pager [phone]

## 2019-11-08 NOTE — DISCHARGE NOTE NURSING/CASE MANAGEMENT/SOCIAL WORK - NSSCTYPOFSERV_GEN_ALL_CORE
RN to assess for skilled needs & provide diabetic reinforcement teaching, to begin the date after discharge.

## 2019-11-14 ENCOUNTER — APPOINTMENT (OUTPATIENT)
Dept: ENDOCRINOLOGY | Facility: HOSPITAL | Age: 74
End: 2019-11-14

## 2019-11-22 ENCOUNTER — OUTPATIENT (OUTPATIENT)
Dept: OUTPATIENT SERVICES | Facility: HOSPITAL | Age: 74
LOS: 1 days | End: 2019-11-22
Payer: MEDICAID

## 2019-11-22 ENCOUNTER — APPOINTMENT (OUTPATIENT)
Dept: UROLOGY | Facility: CLINIC | Age: 74
End: 2019-11-22

## 2019-11-22 DIAGNOSIS — R35.0 FREQUENCY OF MICTURITION: ICD-10-CM

## 2019-11-22 PROCEDURE — G0463: CPT

## 2019-11-22 NOTE — PHYSICAL EXAM
[General Appearance - Well Developed] : well developed [Normal Appearance] : normal appearance [General Appearance - In No Acute Distress] : no acute distress [Edema] : no peripheral edema [Abdomen Soft] : soft [Abdomen Tenderness] : non-tender [Costovertebral Angle Tenderness] : no ~M costovertebral angle tenderness [] : no rash [Motor Exam] : the motor exam was normal [Oriented To Time, Place, And Person] : oriented to person, place, and time

## 2019-11-22 NOTE — ASSESSMENT
[FreeTextEntry1] : 73yo F who was recently admitted to [facility] with emphysematous pyelitis and had right nephrostomy tube placed. \par - Nephrostomy tube removed today in the office without complication. \par - Patient should f/u with her PCP to discuss optimization of insulin as she is a new user post-discharge\par - Finished 14-day course of abx (end 11/18 - Bactrim)\par - RTC 3 months for symptom check

## 2019-11-22 NOTE — REVIEW OF SYSTEMS
[see HPI] : see HPI [Negative] : Psychiatric [Fever] : no fever [Chills] : no chills [Chest Pain] : no chest pain [Shortness Of Breath] : no shortness of breath [Cough] : no cough [Abdominal Pain] : no abdominal pain [Vomiting] : no vomiting [Constipation] : no constipation [Diarrhea] : no diarrhea

## 2019-11-22 NOTE — HISTORY OF PRESENT ILLNESS
[FreeTextEntry1] : WEST SENA is a 73yo F HTN, HLD, DM2 who presents today as follow-up after discharge from [facility] on 11/8 after being admitted for emphysematous pyelitis.  Her admission was notable for sepsis 2/2 R. emphysematous pyelitis.  CT showed hydroureteronephrosis to pelvic brim without etiology of obstruction, severe perinephric stranding, and gas in upper tracts.  Subsequent R. nephrostomy tube placed.  She was taken to ICU and required vasopressor support.  Blood/UCx grew E. Coli, repeat BCx negatiev.  She was given a 14-day course of abx with Bactrim outpatient (finished 11/18).  \par Of note, the patient was found to have severe hyperglycemia, previously on metformin, and endocrinology consulted while inpatient.  They recommended to start insulin and started after discharge.  Per the son, they were unable to be seen by endocrinology and are concerned her sugars aren't being optimized. \par In the office today, she is well appearing, without complaint.  She denies any flank pain/fever/chills, nausea/vomiting, dysuria, hematuria, frequency, urgency, incontinence.

## 2019-12-06 DIAGNOSIS — N12 TUBULO-INTERSTITIAL NEPHRITIS, NOT SPECIFIED AS ACUTE OR CHRONIC: ICD-10-CM

## 2020-02-20 ENCOUNTER — APPOINTMENT (OUTPATIENT)
Dept: ENDOCRINOLOGY | Facility: HOSPITAL | Age: 75
End: 2020-02-20
Payer: MEDICAID

## 2020-02-20 ENCOUNTER — RESULT CHARGE (OUTPATIENT)
Age: 75
End: 2020-02-20

## 2020-02-20 ENCOUNTER — OUTPATIENT (OUTPATIENT)
Dept: OUTPATIENT SERVICES | Facility: HOSPITAL | Age: 75
LOS: 1 days | End: 2020-02-20
Payer: MEDICAID

## 2020-02-20 VITALS
BODY MASS INDEX: 25.27 KG/M2 | HEIGHT: 64 IN | HEART RATE: 103 BPM | RESPIRATION RATE: 14 BRPM | DIASTOLIC BLOOD PRESSURE: 85 MMHG | WEIGHT: 148 LBS | SYSTOLIC BLOOD PRESSURE: 146 MMHG

## 2020-02-20 DIAGNOSIS — E06.9 THYROIDITIS, UNSPECIFIED: ICD-10-CM

## 2020-02-20 LAB
ALBUMIN SERPL ELPH-MCNC: 4.7 G/DL — SIGNIFICANT CHANGE UP (ref 3.3–5)
ALP SERPL-CCNC: 72 U/L — SIGNIFICANT CHANGE UP (ref 40–120)
ALT FLD-CCNC: 17 U/L — SIGNIFICANT CHANGE UP (ref 10–45)
ANION GAP SERPL CALC-SCNC: 15 MMOL/L — SIGNIFICANT CHANGE UP (ref 5–17)
AST SERPL-CCNC: 17 U/L — SIGNIFICANT CHANGE UP (ref 10–40)
BILIRUB SERPL-MCNC: 0.5 MG/DL — SIGNIFICANT CHANGE UP (ref 0.2–1.2)
BUN SERPL-MCNC: 21 MG/DL — SIGNIFICANT CHANGE UP (ref 7–23)
CALCIUM SERPL-MCNC: 10.4 MG/DL — SIGNIFICANT CHANGE UP (ref 8.4–10.5)
CHLORIDE SERPL-SCNC: 106 MMOL/L — SIGNIFICANT CHANGE UP (ref 96–108)
CO2 SERPL-SCNC: 24 MMOL/L — SIGNIFICANT CHANGE UP (ref 22–31)
CREAT ?TM UR-MCNC: 75 MG/DL — SIGNIFICANT CHANGE UP
CREAT SERPL-MCNC: 0.88 MG/DL — SIGNIFICANT CHANGE UP (ref 0.5–1.3)
ESTIMATED AVERAGE GLUCOSE: 123 MG/DL — HIGH (ref 68–114)
GLUCOSE BLDC GLUCOMTR-MCNC: 177 MG/DL — HIGH (ref 70–99)
GLUCOSE SERPL-MCNC: 127 MG/DL — HIGH (ref 70–99)
HBA1C BLD-MCNC: 5.9 % — HIGH (ref 4–5.6)
MICROALBUMIN UR-MCNC: 1.3 MG/DL — SIGNIFICANT CHANGE UP
MICROALBUMIN/CREAT UR-RTO: 18 MG/G — SIGNIFICANT CHANGE UP (ref 0–30)
POTASSIUM SERPL-MCNC: 5.3 MMOL/L — SIGNIFICANT CHANGE UP (ref 3.5–5.3)
POTASSIUM SERPL-SCNC: 5.3 MMOL/L — SIGNIFICANT CHANGE UP (ref 3.5–5.3)
PROT SERPL-MCNC: 7.8 G/DL — SIGNIFICANT CHANGE UP (ref 6–8.3)
SODIUM SERPL-SCNC: 145 MMOL/L — SIGNIFICANT CHANGE UP (ref 135–145)

## 2020-02-20 PROCEDURE — G0463: CPT

## 2020-02-20 PROCEDURE — 83036 HEMOGLOBIN GLYCOSYLATED A1C: CPT

## 2020-02-20 PROCEDURE — 80053 COMPREHEN METABOLIC PANEL: CPT

## 2020-02-20 PROCEDURE — 99204 OFFICE O/P NEW MOD 45 MIN: CPT

## 2020-02-20 PROCEDURE — 82043 UR ALBUMIN QUANTITATIVE: CPT

## 2020-02-20 PROCEDURE — 84443 ASSAY THYROID STIM HORMONE: CPT

## 2020-02-20 PROCEDURE — 82962 GLUCOSE BLOOD TEST: CPT

## 2020-02-20 PROCEDURE — 36415 COLL VENOUS BLD VENIPUNCTURE: CPT

## 2020-02-20 RX ORDER — MULTIVIT/IRON SULF/FOLIC ACID 15MG-0.4MG
TABLET ORAL DAILY
Refills: 0 | Status: ACTIVE | COMMUNITY
Start: 2020-02-20

## 2020-02-20 RX ORDER — FOLIC ACID 1 MG/1
1 TABLET ORAL DAILY
Refills: 0 | Status: ACTIVE | COMMUNITY
Start: 2020-02-20

## 2020-02-20 RX ORDER — BLOOD SUGAR DIAGNOSTIC
STRIP MISCELLANEOUS
Qty: 1 | Refills: 3 | Status: DISCONTINUED | COMMUNITY
Start: 2020-02-20 | End: 2020-02-20

## 2020-02-20 RX ORDER — INSULIN GLARGINE 100 [IU]/ML
100 INJECTION, SOLUTION SUBCUTANEOUS
Refills: 0 | Status: DISCONTINUED | COMMUNITY
Start: 2020-02-20 | End: 2020-02-20

## 2020-02-20 RX ORDER — FLASH GLUCOSE SCANNING READER
EACH MISCELLANEOUS
Qty: 1 | Refills: 0 | Status: ACTIVE | COMMUNITY
Start: 2020-02-20 | End: 1900-01-01

## 2020-02-20 RX ORDER — LANCETS
EACH MISCELLANEOUS
Qty: 1 | Refills: 0 | Status: ACTIVE | COMMUNITY
Start: 2020-02-20 | End: 1900-01-01

## 2020-02-20 RX ORDER — INSULIN LISPRO 100 [IU]/ML
100 INJECTION, SOLUTION INTRAVENOUS; SUBCUTANEOUS
Refills: 0 | Status: DISCONTINUED | COMMUNITY
Start: 2020-02-20 | End: 2020-02-20

## 2020-02-20 RX ORDER — BLOOD-GLUCOSE METER
KIT MISCELLANEOUS
Qty: 1 | Refills: 0 | Status: ACTIVE | COMMUNITY
Start: 2020-02-20 | End: 1900-01-01

## 2020-02-20 RX ORDER — LANCETS
EACH MISCELLANEOUS
Qty: 1 | Refills: 3 | Status: DISCONTINUED | COMMUNITY
Start: 2020-02-20 | End: 2020-02-20

## 2020-02-21 ENCOUNTER — APPOINTMENT (OUTPATIENT)
Dept: UROLOGY | Facility: CLINIC | Age: 75
End: 2020-02-21

## 2020-02-21 ENCOUNTER — OUTPATIENT (OUTPATIENT)
Dept: OUTPATIENT SERVICES | Facility: HOSPITAL | Age: 75
LOS: 1 days | End: 2020-02-21
Payer: MEDICAID

## 2020-02-21 DIAGNOSIS — N39.3 STRESS INCONTINENCE (FEMALE) (MALE): ICD-10-CM

## 2020-02-21 DIAGNOSIS — R35.0 FREQUENCY OF MICTURITION: ICD-10-CM

## 2020-02-21 LAB — T4 FREE+ TSH PNL SERPL: 0.91 UIU/ML — SIGNIFICANT CHANGE UP (ref 0.27–4.2)

## 2020-02-21 PROCEDURE — G0463: CPT

## 2020-02-21 NOTE — ASSESSMENT
[Carbohydrate Consistent Diet] : carbohydrate consistent diet [Hypoglycemia Management] : hypoglycemia management [Diabetes Foot Care] : diabetes foot care [Long Term Vascular Complications] : long term vascular complications of diabetes [Importance of Diet and Exercise] : importance of diet and exercise to improve glycemic control, achieve weight loss and improve cardiovascular health [Self Monitoring of Blood Glucose] : self monitoring of blood glucose [Action and use of Insulin] : action and use of short and long-acting insulin [Insulin Self-Administration] : insulin self-administration [Injection Technique, Storage, Sharps Disposal] : injection technique, storage, and sharps disposal [Retinopathy Screening] : Patient was referred to ophthalmology for retinopathy screening [FreeTextEntry1] : 73 y/o F with PMH of Type 2 DM, HTN, HLD presents for initial visit.\par \par 1. Type 2 DM- last A1C 10.6 in Nov 2019\par -check A1C today\par -Based on BG logs, will c/w lantus 30 units qhs. Will increase pre-meal for dinner only for now with limited BG data to 16-16-18. c/w metformin 1G BID\par -Discussed importance of checking BG 4X/day and will prescribe freestyle gladys to see if covered by insurance\par -Discussed dietary modifcations as well as hypoglycemia management\par -check CMP\par -check microalb/crt\par \par 2. HTN\par -BP borderline elevated >130/80. Not on BP meds. Monitor with PMD since 1 reading, and may need addition of a BP med\par \par 3. HLD\par -recent LDL 62. c.w atorvastatin 10mg for now\par \par 4. Suppressed TSH\par -found with TSH 0.23 while inpatient. Will repeat TSH and Ft4\par \par F/U in 3 mos\par \par D/W Dr. Yousif\par

## 2020-02-21 NOTE — HISTORY OF PRESENT ILLNESS
[Urinary Incontinence] : urinary incontinence [Nocturia] : nocturia [Urinary Retention] : no urinary retention [FreeTextEntry1] : 2/21/20 Interval events.  Patient presents today for follow up.  Denies any fevers, chills, dysuria, flank pain, any signs of infection.  Only complaints is mild stress incontinence, which is not new, but she has had a cough lately so it has been happening more.  Requesting Rx for adult diapers.  Patient is not very bothered by her symptoms and has no interest in having surgery.  Started seeing endocrinology.\par \par Previous History:  WEST SENA is a 73yo F HTN, HLD, DM2 who presents today as follow-up after discharge from [facility] on 11/8 after being admitted for emphysematous pyelitis.  Her admission was notable for sepsis 2/2 R. emphysematous pyelitis.  CT showed hydroureteronephrosis to pelvic brim without etiology of obstruction, severe perinephric stranding, and gas in upper tracts.  Subsequent R. nephrostomy tube placed.  She was taken to ICU and required vasopressor support.  Blood/UCx grew E. Coli, repeat BCx negatiev.  She was given a 14-day course of abx with Bactrim outpatient (finished 11/18).  \par Of note, the patient was found to have severe hyperglycemia, previously on metformin, and endocrinology consulted while inpatient.  They recommended to start insulin and started after discharge.  Per the son, they were unable to be seen by endocrinology and are concerned her sugars aren't being optimized. \par In the office today, she is well appearing, without complaint.  She denies any flank pain/fever/chills, nausea/vomiting, dysuria, hematuria, frequency, urgency, incontinence.    [Urinary Urgency] : no urinary urgency [Straining] : no straining [Dysuria] : no dysuria [Urinary Frequency] : no urinary frequency [Flank Pain] : no flank pain [Abdominal Pain] : no abdominal pain [Hematuria - Gross] : no gross hematuria [Fever] : no fever

## 2020-02-21 NOTE — PHYSICAL EXAM
[Alert] : alert [No Acute Distress] : no acute distress [Well Nourished] : well nourished [Well Developed] : well developed [PERRL] : pupils equal, round and reactive to light [Normal Sclera/Conjunctiva] : normal sclera/conjunctiva [EOMI] : extra ocular movement intact [Normal Oropharynx] : the oropharynx was normal [No Neck Mass] : no neck mass was observed [Supple] : the neck was supple [Thyroid Not Enlarged] : the thyroid was not enlarged [No LAD] : no lymphadenopathy [No Respiratory Distress] : no respiratory distress [Normal Rate and Effort] : normal respiratory rhythm and effort [Clear to Auscultation] : lungs were clear to auscultation bilaterally [No Accessory Muscle Use] : no accessory muscle use [Normal Rate] : heart rate was normal  [Regular Rhythm] : with a regular rhythm [Normal S1, S2] : normal S1 and S2 [No Edema] : there was no peripheral edema [Normal Bowel Sounds] : normal bowel sounds [Not Tender] : non-tender [Soft] : abdomen soft [Not Distended] : not distended [Post Cervical Nodes] : posterior cervical nodes [Anterior Cervical Nodes] : anterior cervical nodes [Normal Gait] : normal gait [No Joint Swelling] : no joint swelling seen [No Clubbing, Cyanosis] : no clubbing  or cyanosis of the fingernails [Normal Strength/Tone] : muscle strength and tone were normal [No Rash] : no rash [No Skin Lesions] : no skin lesions [Right Foot Was Examined] : right foot ~C was examined [Left Foot Was Examined] : left foot ~C was examined [Normal] : normal [Cranial Nerves Intact] : cranial nerves 2-12 were intact [No Motor Deficits] : the motor exam was normal [No Tremors] : no tremors [No Sensory Deficits] : the sensory exam was normal to light touch and pinprick [Normal Sensation on Monofilament Testing] : normal sensation on monofilament testing of lower extremities [Oriented x3] : oriented to person, place, and time [Normal Insight/Judgement] : insight and judgment were intact [Normal Affect] : the affect was normal [Normal Mood] : the mood was normal [Acanthosis Nigricans] : no acanthosis nigricans

## 2020-02-21 NOTE — ASSESSMENT
[FreeTextEntry1] : 73yo F previous history of emphysematous pyelitis, resolved, and mild stress incontinence.\par \par -Patient is requesting adult diapers\par -Patient is not bothered by her symptoms and is not interested in surgery\par -Rx for diapers given\par -Return to clinic in 6 months

## 2020-02-21 NOTE — REVIEW OF SYSTEMS
[see HPI] : see HPI [Negative] : Heme/Lymph [Fever] : no fever [Chest Pain] : no chest pain [Chills] : no chills [Shortness Of Breath] : no shortness of breath [Cough] : no cough [Vomiting] : no vomiting [Diarrhea] : no diarrhea [Abdominal Pain] : no abdominal pain [Constipation] : no constipation

## 2020-02-21 NOTE — HISTORY OF PRESENT ILLNESS
[FreeTextEntry1] : 73 y/o F with PMH of Type 2 DM, HTN, HLD presents for initial visit.\par \par Patient decline  services and prefers to use son. \par \par Of note, was recently hospitalized for sepsis, found to have emphysematous pyelitis. A1C 10.6 at that time. Was seen by zachary and BRETT on lantus 30 and humalog 16 TID. Also on metformin 1G BID. Has had Type 2 DM for 10 years. Previous to hospitalization was only on metformin 1000mg BID. \par Checks BG 2X/day. In AM 130s-180s. Pre-bedtime 180s-210. Does not like sticking herself.\par \par Diet: high in rice- has white rice for every meal. No juices or soda.\par Nov 2019 GFR 73\par A1C 10.6\par TSH 0.23\par LDL 62\par FH: daughter with DM\par SH: no smoking, alcohol \par Denies hx of CVA or CAD\par last optho in oct 2019- no hx of retinopathy. Denies neuropathy.

## 2020-02-21 NOTE — END OF VISIT
[] : Fellow [FreeTextEntry3] : 75 yo F with uncontrolled Type 2 DM, A1C >10.6%, no known major microvascular or macrovascular complications, HTN, HLD, and recent abnormal TSH level.  Recently started on basal bolus inulin regimen with Basaglar 30 units, Admelog 16/16/16 tid with meals with Metfromin 1000mg bid (EGFR 73).  Glucose log notable for glucose greater than 200mg/dl range after dinner, hence will adjustment dinner time Admelog does to 17-18 units with meals.  Will check microablumin/creatinine ratio, BP slightly elevated, will monitor but also consider starting ACEi ig microalumunira.  Continue with statin thearpy. LDL at goal.  Check TSH, free T4 level.

## 2020-02-23 DIAGNOSIS — N39.3 STRESS INCONTINENCE (FEMALE) (MALE): ICD-10-CM

## 2020-02-24 DIAGNOSIS — E11.9 TYPE 2 DIABETES MELLITUS WITHOUT COMPLICATIONS: ICD-10-CM

## 2020-02-24 DIAGNOSIS — I10 ESSENTIAL (PRIMARY) HYPERTENSION: ICD-10-CM

## 2020-02-24 DIAGNOSIS — E78.5 HYPERLIPIDEMIA, UNSPECIFIED: ICD-10-CM

## 2020-02-27 LAB — GLUCOSE BLDC GLUCOMTR-MCNC: 177

## 2020-03-10 NOTE — CONSULT NOTE ADULT - SUBJECTIVE AND OBJECTIVE BOX
73 y/o woman for Madeleine brought in to ED after found to have blood sugar > 600 at home. Pt reportedly was taking Metformin that she brought from [facility] last month that ran out and did not take any because of lack of insurance. Pt also c/o pain in right flank radiating to right lateral abdomen,  N/V and chills. Pt denies fever, dysuria, hematuria, CP, SOB, dizziness.    Urology consult is requested after CT abd/pel findings suspicious for aspermatous pyelonephritis      PAST MEDICAL & SURGICAL HISTORY:  HLD (hyperlipidemia)  DM (diabetes mellitus)  HTN  No significant past surgical history      Home Medications:  atorvastatin 10 mg oral tablet: 1 tab(s) orally once a day (2019 05:17)  metFORMIN 500 mg oral tablet: 1 tab(s) orally 2 times a day (2019 05:17)    Social Hx:  No tobacco/EtOH    All: NKDA    PE: awake, responsive, NAD    Vital Signs Last 24 Hrs  T(C): 36.9 (2019 07:32), Max: 36.9 (2019 01:49)  T(F): 98.4 (2019 07:32), Max: 98.5 (2019 01:49)  HR: 116 (2019 07:32) (116 - 123)  BP: 120/69 (2019 07:32) (120/69 - 153/93)  BP(mean): --  RR: 17 (2019 07:32) (17 - 18)  SpO2: 97% (2019 07:32) (97% - 97%)    Chest: B/L NS, decreased at bases  CVS: S1S2, tachycardic  Abd: soft, protuberant, +tenderness in right flank/right lateral abdomen no guarding or rebound, suprapubic flat and non-tender                            12.1   13.80 )-----------( 224      ( 2019 02:54 )             37.2     11-03    133<L>  |  98  |  18  ----------------------------<  661<HH>  3.5   |  21<L>  |  1.43<H>    Ca    9.7      2019 02:54  Phos  2.5     11-  Mg     1.6     11    TPro  8.5<H>  /  Alb  3.7  /  TBili  1.2  /  DBili  x   /  AST  16  /  ALT  25  /  AlkPhos  108      Urinalysis Basic - ( 2019 02:54 )    Color: Yellow / Appearance: Clear / S.010 / pH: x  Gluc: x / Ketone: Moderate  / Bili: Negative / Urobili: Negative   Blood: x / Protein: 30 mg/dL / Nitrite: Negative   Leuk Esterase: Small / RBC: >50 /HPF / WBC 3-5 /HPF   Sq Epi: x / Non Sq Epi: Occasional /HPF / Bacteria: Negative /HPF    < from: CT Abdomen and Pelvis No Cont (19 @ 08:27) >  EXAM:  CT ABDOMEN AND PELVIS                            PROCEDURE DATE:  2019          INTERPRETATION:  CLINICAL INDICATION: 74 years  Female with Rt flank   pain.     COMPARISON: None.    PROCEDURE:   CT of the Abdomen and Pelvis was performed without intravenous contrast.   Intravenous contrast: None.  Oral contrast: None.  Sagittal and coronal reformats were performed.    LIMITATIONS: Evaluation of the solid organs and GI tract is limited   without oral and IV contrast.    FINDINGS:    LOWER CHEST: Mild bibasilar dependent changes. Coronary artery   calcifications. Mild cardiomegaly.    LIVER: Enlarged caudate lobe. Lobular contour. Consider cirrhosis.  BILE DUCTS: Normal caliber.  GALLBLADDER: Within normal limits.  SPLEEN: Mildlyenlarged measuring 13.9 cm sagittal. Splenic contour.  PANCREAS: Within normal limits.  ADRENALS: Within normal limits.  KIDNEYS/URETERS: There is moderate right hydroureteronephrosis down to   level urinary bladder with moderate right perinephric and periureteral   inflammatory stranding. No calculus is identified. There is air in the   right mid and upper pole calyces as well as within the right ureter.    BLADDER: Air is present in the urinary bladder.  REPRODUCTIVE ORGANS: Unremarkable uterus.    BOWEL: No bowel obstruction. Appendix is normal.  PERITONEUM: No ascites.  VESSELS: Within normal limits.  RETROPERITONEUM/LYMPH NODES: No lymphadenopathy.    ABDOMINAL WALL: 3.3 cm fat-containing umbilical hernia.  BONES: Mild thoracic degenerative changes. Grade 1 L4-5 anterolisthesis.    IMPRESSION:     Moderate right hydroureteronephrosis with perinephric and periureteral   fat stranding. Air within the right renal collecting system, right ureter   and urinary bladder without history of catheterization. 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    Surgical, urology and nephrology evaluation advised.    Enlarged hepatic caudate lobe and lobular hepatic contour suggestive of   cirrhosis.    Mild splenomegaly.    CRITICAL VALUE: I discussed the major findings in this report with NP   Chano in ED Holding 11/3/2019 8:52 AM by Dr. Ramírez with readback.   Critical value policy of the hospital was followed. Read back and   confirmation of receipt of this communication was  performed. Thisverbal communication supplements the text report of this   document.      BENJAMIN RAMÍREZ M.D., ATTENDING RADIOLOGIST  This document has been electronically signed. Nov  3 2019  8:58AM    < end of copied text > 73 y/o woman for Madeleine brought in to ED after found to have blood sugar > 600 at home. Pt reportedly was taking Metformin that she brought from [facility] last month that ran out and did not take any because of lack of insurance. Pt also c/o pain in right flank radiating to right lateral abdomen,  N/V and chills. Pt denies fever, dysuria, hematuria, CP, SOB, dizziness.    Urology consult is requested after CT abd/pel findings suspicious for aspermatous pyelonephritis      PAST MEDICAL & SURGICAL HISTORY:  HLD (hyperlipidemia)  DM (diabetes mellitus)  HTN  No significant past surgical history      Home Medications:  atorvastatin 10 mg oral tablet: 1 tab(s) orally once a day (2019 05:17)  metFORMIN 500 mg oral tablet: 1 tab(s) orally 2 times a day (2019 05:17)    Social Hx:  No tobacco/EtOH    All: NKDA    PE: awake, responsive, NAD    Vital Signs Last 24 Hrs  T(C): 36.9 (2019 07:32), Max: 36.9 (2019 01:49)  T(F): 98.4 (2019 07:32), Max: 98.5 (2019 01:49)  HR: 116 (2019 07:32) (116 - 123)  BP: 120/69 (2019 07:32) (120/69 - 153/93)  BP(mean): --  RR: 17 (2019 07:32) (17 - 18)  SpO2: 97% (2019 07:32) (97% - 97%)  gen nad  Chest: B/L NS, decreased at bases  CVS: S1S2, tachycardic  Abd: soft, protuberant, +tenderness in right flank/right lateral abdomen no guarding or rebound, suprapubic flat and non-tender  almonte clear urine   right cva tenderness  no le edema                            12.1   13.80 )-----------( 224      ( 2019 02:54 )             37.2     11-03    133<L>  |  98  |  18  ----------------------------<  661<HH>  3.5   |  21<L>  |  1.43<H>    Ca    9.7      2019 02:54  Phos  2.5       Mg     1.6         TPro  8.5<H>  /  Alb  3.7  /  TBili  1.2  /  DBili  x   /  AST  16  /  ALT  25  /  AlkPhos  108      Urinalysis Basic - ( 2019 02:54 )    Color: Yellow / Appearance: Clear / S.010 / pH: x  Gluc: x / Ketone: Moderate  / Bili: Negative / Urobili: Negative   Blood: x / Protein: 30 mg/dL / Nitrite: Negative   Leuk Esterase: Small / RBC: >50 /HPF / WBC 3-5 /HPF   Sq Epi: x / Non Sq Epi: Occasional /HPF / Bacteria: Negative /HPF    < from: CT Abdomen and Pelvis No Cont (19 @ 08:27) >  EXAM:  CT ABDOMEN AND PELVIS                            PROCEDURE DATE:  2019          INTERPRETATION:  CLINICAL INDICATION: 74 years  Female with Rt flank   pain.     COMPARISON: None.    PROCEDURE:   CT of the Abdomen and Pelvis was performed without intravenous contrast.   Intravenous contrast: None.  Oral contrast: None.  Sagittal and coronal reformats were performed.    LIMITATIONS: Evaluation of the solid organs and GI tract is limited   without oral and IV contrast.    FINDINGS:    LOWER CHEST: Mild bibasilar dependent changes. Coronary artery   calcifications. Mild cardiomegaly.    LIVER: Enlarged caudate lobe. Lobular contour. Consider cirrhosis.  BILE DUCTS: Normal caliber.  GALLBLADDER: Within normal limits.  SPLEEN: Mildlyenlarged measuring 13.9 cm sagittal. Splenic contour.  PANCREAS: Within normal limits.  ADRENALS: Within normal limits.  KIDNEYS/URETERS: There is moderate right hydroureteronephrosis down to   level urinary bladder with moderate right perinephric and periureteral   inflammatory stranding. No calculus is identified. There is air in the   right mid and upper pole calyces as well as within the right ureter.    BLADDER: Air is present in the urinary bladder.  REPRODUCTIVE ORGANS: Unremarkable uterus.    BOWEL: No bowel obstruction. Appendix is normal.  PERITONEUM: No ascites.  VESSELS: Within normal limits.  RETROPERITONEUM/LYMPH NODES: No lymphadenopathy.    ABDOMINAL WALL: 3.3 cm fat-containing umbilical hernia.  BONES: Mild thoracic degenerative changes. Grade 1 L4-5 anterolisthesis.    IMPRESSION:     Moderate right hydroureteronephrosis with perinephric and periureteral   fat stranding. Air within the right renal collecting system, right ureter   and urinary bladder without history of catheterization. 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    Surgical, urology and nephrology evaluation advised.    Enlarged hepatic caudate lobe and lobular hepatic contour suggestive of   cirrhosis.    Mild splenomegaly.    CRITICAL VALUE: I discussed the major findings in this report with NP   Chano in ED Holding 11/3/2019 8:52 AM by Dr. Ramírez with readback.   Critical value policy of the hospital was followed. Read back and   confirmation of receipt of this communication was  performed. Thisverbal communication supplements the text report of this   document.      BENJAMIN RAMÍREZ M.D., ATTENDING RADIOLOGIST  This document has been electronically signed. Nov  3 2019  8:58AM    < end of copied text > normal...

## 2020-06-18 ENCOUNTER — OUTPATIENT (OUTPATIENT)
Dept: OUTPATIENT SERVICES | Facility: HOSPITAL | Age: 75
LOS: 1 days | End: 2020-06-18
Payer: MEDICAID

## 2020-06-18 ENCOUNTER — APPOINTMENT (OUTPATIENT)
Dept: ENDOCRINOLOGY | Facility: HOSPITAL | Age: 75
End: 2020-06-18
Payer: MEDICAID

## 2020-06-18 DIAGNOSIS — E06.9 THYROIDITIS, UNSPECIFIED: ICD-10-CM

## 2020-06-18 PROCEDURE — G0463: CPT

## 2020-06-18 PROCEDURE — ZZZZZ: CPT

## 2020-06-18 RX ORDER — FLASH GLUCOSE SENSOR
KIT MISCELLANEOUS
Qty: 2 | Refills: 4 | Status: ACTIVE | COMMUNITY
Start: 2020-02-20 | End: 1900-01-01

## 2020-06-18 RX ORDER — ATORVASTATIN CALCIUM 10 MG/1
10 TABLET, FILM COATED ORAL
Qty: 30 | Refills: 5 | Status: ACTIVE | COMMUNITY
Start: 2020-02-20 | End: 1900-01-01

## 2020-06-18 RX ORDER — INSULIN LISPRO 100 U/ML
100 INJECTION, SOLUTION SUBCUTANEOUS
Qty: 1 | Refills: 4 | Status: ACTIVE | COMMUNITY
Start: 2020-02-20 | End: 1900-01-01

## 2020-06-18 RX ORDER — PEN NEEDLE, DIABETIC 29 G X1/2"
32G X 4 MM NEEDLE, DISPOSABLE MISCELLANEOUS
Qty: 1 | Refills: 3 | Status: ACTIVE | COMMUNITY
Start: 2020-02-20 | End: 1900-01-01

## 2020-06-18 RX ORDER — METFORMIN HYDROCHLORIDE 1000 MG/1
1000 TABLET, COATED ORAL TWICE DAILY
Qty: 1 | Refills: 5 | Status: ACTIVE | COMMUNITY
Start: 2020-02-20 | End: 1900-01-01

## 2020-06-18 RX ORDER — INSULIN GLARGINE 100 [IU]/ML
100 INJECTION, SOLUTION SUBCUTANEOUS
Qty: 1 | Refills: 3 | Status: ACTIVE | COMMUNITY
Start: 2020-02-20 | End: 1900-01-01

## 2020-06-20 NOTE — REVIEW OF SYSTEMS
[Cough] : cough [Back Pain] : back pain [As Noted in HPI] : as noted in HPI [Hair Loss] : hair loss [Pain/Numbness of Digits] : pain/numbness of digits [Insomnia] : insomnia [All other systems negative] : All other systems negative [Fatigue] : no fatigue [Recent Weight Gain (___ Lbs)] : no recent weight gain [Recent Weight Loss (___ Lbs)] : no recent weight loss [Dry Eyes] : no dryness [Blurred Vision] : no blurred vision [Dysphagia] : no dysphagia [Neck Pain] : no neck pain [Dysphonia] : no dysphonia [Chest Pain] : no chest pain [Palpitations] : no palpitations [Nausea] : no nausea [Abdominal Pain] : no abdominal pain [Vomiting] : no vomiting [Polyuria] : no polyuria [Dysuria] : no dysuria [Dry Skin] : no dry skin [Dizziness] : no dizziness [Polydipsia] : no polydipsia [Cold Intolerance] : no cold intolerance [Heat Intolerance] : no heat intolerance

## 2020-06-20 NOTE — DATA REVIEWED
[FreeTextEntry1] : Feb 2020\par Hba1c 5.9%\par eGFR 65\par Urine microalbumin/ Cr 18.\par TSH with free T4 reflex: 0.93\par LFTs wnl\par \par Nov 2019\par A1C 10.6\par  GFR 73\par TSH 0.23\par LDL 62

## 2020-06-20 NOTE — ASSESSMENT
[FreeTextEntry1] : 76 y/o F with PMH of Type 2 DM, HTN, HLD presents for f/u telephone visit\par \par 1. Type 2 DM- last A1C 5.9% in Feb 2020\par -Will check A1c at next visit in Sept 2020\par -Based on BG logs, will c/w Lantus 30 units qhs and recommended Humalog 10-12 units pre meals and metformin 1G BID\par -Continue to monitor blood sugars 3-4 times a day, using free style gladys. \par -Encouraged to continue with consistent carb diet \par -Up to date with opthalmology\par -Urine microalbumin/ Cr 18 Feb 2020\par \par 2. HTN\par -BP goal <130/80. On enalapril 2.5 mg qd since past 2 weeks but noted to have dry cough so will switch to losartan 25 mg qd or 1/2 tab QD \par \par 3. HLD\par -Recent LDL 62 11/2019. c.w atorvastatin 10mg for now, recheck lipid profile at next visit. \par \par 4. Suppressed TSH\par -found with TSH 0.23 while inpatient in nov 2019. Repeat TSH with FT4 reflex in Feb 2019 wnl, 0.91\par \par F/U visit in 3 months. \par \par D/W Dr. Pedersen. \par  [Diabetes Foot Care] : diabetes foot care [Long Term Vascular Complications] : long term vascular complications of diabetes [Carbohydrate Consistent Diet] : carbohydrate consistent diet [Hypoglycemia Management] : hypoglycemia management [Action and use of Insulin] : action and use of short and long-acting insulin [Importance of Diet and Exercise] : importance of diet and exercise to improve glycemic control, achieve weight loss and improve cardiovascular health [Self Monitoring of Blood Glucose] : self monitoring of blood glucose [Retinopathy Screening] : Patient was referred to ophthalmology for retinopathy screening [Sick-Day Management] : sick-day management [Injection Technique, Storage, Sharps Disposal] : injection technique, storage, and sharps disposal

## 2020-06-20 NOTE — HISTORY OF PRESENT ILLNESS
[Home] : at home, [unfilled] , at the time of the visit. [Medical Office: ([facility])___] : at the medical office located in  [Verbal consent obtained from patient] : the patient, [unfilled] [Other:____] : [unfilled] [FreeTextEntry1] : 74 y/o F with PMH of Type 2 DM, HTN, HLD , following for T2DM. \par \par Patient speaks Tamil and prefers daughter to translate over the phone. Declined  services. \par \par History of T2DM for 10 years. Her initial visit was in Feb 2020. Prior to that she was hospitalized for emphysematous pyelitis and her A1C at that time was noted to be 10.6%, was seen by zachary and DC on lantus 30 and humalog 16 TID in addition to her home medication metformin 1G BID.\par \par Hba1c in Feb 2020 was noted to be 5.9%. Currently taking Basaglar 30 units qd, Humalog 8-14 units three times a day before meals and Metformin 1000 mg bid. \par Checks BG 2-3 times a day with free style gladys: AM: 90s-120s , occasionally in 70s-80s. PM: 170s-180s, occasionally in 200s, highest 250 once. \par Eat 3 meals a day. \par Breakfast: whole wheat bread with peanut butter\par Lunch: Oat meal with vegetables \par Dinner: Roti (small 2-3) with vegetables\par Rice only 2-3 times a month. \par \par Endorse occasional neuropathy. No known retinopathy, last optho visit Oct 2019, follows annually. No known nephropathy, urine microalbumin/ Cr 18 Feb 2020. Denies hx of CVA or CAD. \par \par Weight has been stable and reports as 151 lbs. \par Measures BP also routinely and says usually ranges around 120s/80s. Taking enalapril 2.5 mg qd for past 2 weeks and complaints of dry cough. \par \par On atorvastatin 10 mg qd. LDL was 62 from Nov 2019.

## 2020-06-22 DIAGNOSIS — E11.9 TYPE 2 DIABETES MELLITUS WITHOUT COMPLICATIONS: ICD-10-CM

## 2020-06-22 DIAGNOSIS — I10 ESSENTIAL (PRIMARY) HYPERTENSION: ICD-10-CM

## 2020-06-22 DIAGNOSIS — E78.5 HYPERLIPIDEMIA, UNSPECIFIED: ICD-10-CM

## 2020-07-15 NOTE — PATIENT PROFILE ADULT - NSPROIMPLANTSMEDDEV_GEN_A_NUR
[Today's Date] : [unfilled] [FreeTextEntry2] : Focused study. Coronary artery measurements normal.  LAD looks prominent but measures normally.  Normal LV systolic function. no effusions. None

## 2020-08-21 ENCOUNTER — OUTPATIENT (OUTPATIENT)
Dept: OUTPATIENT SERVICES | Facility: HOSPITAL | Age: 75
LOS: 1 days | End: 2020-08-21
Payer: MEDICAID

## 2020-08-21 ENCOUNTER — APPOINTMENT (OUTPATIENT)
Dept: UROLOGY | Facility: CLINIC | Age: 75
End: 2020-08-21

## 2020-08-21 VITALS — TEMPERATURE: 98.1 F

## 2020-08-21 DIAGNOSIS — N12 TUBULO-INTERSTITIAL NEPHRITIS, NOT SPECIFIED AS ACUTE OR CHRONIC: ICD-10-CM

## 2020-08-21 DIAGNOSIS — N39.3 STRESS INCONTINENCE (FEMALE) (MALE): ICD-10-CM

## 2020-08-21 DIAGNOSIS — R35.0 FREQUENCY OF MICTURITION: ICD-10-CM

## 2020-08-21 DIAGNOSIS — Z86.39 PERSONAL HISTORY OF OTHER ENDOCRINE, NUTRITIONAL AND METABOLIC DISEASE: ICD-10-CM

## 2020-08-21 PROCEDURE — G0463: CPT

## 2020-08-21 NOTE — ASSESSMENT
[FreeTextEntry1] : 75 year old female with stress urinary incontinence and history of emphysematous pyelonephritis in November of 2019. \par \par - Feeling well, no clinical suspicion for continued infection\par - Patient is not bothered by or interested in intervention for stress urinary incontinence\par - Patient and daughter informed of concerning symptoms for UTI and urged to present for evaluation early to avoid advanced urinary infections in the future.\par - Follow up with Urology as needed

## 2020-08-21 NOTE — HISTORY OF PRESENT ILLNESS
[Urinary Incontinence] : urinary incontinence [FreeTextEntry1] : 75 year old female referred to urology in November 2019 following episode of emphysematous pyelonephritis. She has mild stress urinary incontinence that is not bothersome. She was found to have poorly controlled diabetes at that time, is now following closely with her primary physician, daughter reporting good blood sugar control. She is using diapers that are prescribed by her primary care physician and does not desire any intervention for the incontinence. \par \par Feeling well today, no fevers/chills, dysuria, frequency, urgency, flank pain.

## 2020-08-21 NOTE — PHYSICAL EXAM
[General Appearance - Well Developed] : well developed [Normal Appearance] : normal appearance [General Appearance - In No Acute Distress] : no acute distress [Abdomen Tenderness] : non-tender [Abdomen Mass (___ Cm)] : no abdominal mass palpated [Abdomen Soft] : soft [Costovertebral Angle Tenderness] : no ~M costovertebral angle tenderness [Skin Color & Pigmentation] : normal skin color and pigmentation [Abdomen Hernia] : no hernia was discovered [] : no respiratory distress [Edema] : no peripheral edema [Oriented To Time, Place, And Person] : oriented to person, place, and time

## 2020-08-26 DIAGNOSIS — N39.3 STRESS INCONTINENCE (FEMALE) (MALE): ICD-10-CM

## 2020-08-26 DIAGNOSIS — N12 TUBULO-INTERSTITIAL NEPHRITIS, NOT SPECIFIED AS ACUTE OR CHRONIC: ICD-10-CM

## 2020-08-26 DIAGNOSIS — Z86.39 PERSONAL HISTORY OF OTHER ENDOCRINE, NUTRITIONAL AND METABOLIC DISEASE: ICD-10-CM

## 2020-09-17 ENCOUNTER — OUTPATIENT (OUTPATIENT)
Dept: OUTPATIENT SERVICES | Facility: HOSPITAL | Age: 75
LOS: 1 days | End: 2020-09-17
Payer: MEDICAID

## 2020-09-17 ENCOUNTER — APPOINTMENT (OUTPATIENT)
Dept: ENDOCRINOLOGY | Facility: HOSPITAL | Age: 75
End: 2020-09-17
Payer: MEDICAID

## 2020-09-17 VITALS
HEART RATE: 88 BPM | RESPIRATION RATE: 16 BRPM | DIASTOLIC BLOOD PRESSURE: 84 MMHG | WEIGHT: 152 LBS | BODY MASS INDEX: 25.95 KG/M2 | SYSTOLIC BLOOD PRESSURE: 145 MMHG | HEIGHT: 64 IN | TEMPERATURE: 98.3 F

## 2020-09-17 DIAGNOSIS — O09.899 SUPERVISION OF OTHER HIGH RISK PREGNANCIES, UNSPECIFIED TRIMESTER: ICD-10-CM

## 2020-09-17 LAB
A1C WITH ESTIMATED AVERAGE GLUCOSE RESULT: 6.2 % — HIGH (ref 4–5.6)
ESTIMATED AVERAGE GLUCOSE: 131 MG/DL — HIGH (ref 68–114)
GLUCOSE BLDC GLUCOMTR-MCNC: 141

## 2020-09-17 PROCEDURE — 82962 GLUCOSE BLOOD TEST: CPT

## 2020-09-17 PROCEDURE — 83036 HEMOGLOBIN GLYCOSYLATED A1C: CPT

## 2020-09-17 PROCEDURE — 99215 OFFICE O/P EST HI 40 MIN: CPT | Mod: GC

## 2020-09-17 PROCEDURE — 80061 LIPID PANEL: CPT

## 2020-09-17 PROCEDURE — 80053 COMPREHEN METABOLIC PANEL: CPT

## 2020-09-17 PROCEDURE — G0463: CPT

## 2020-09-17 RX ORDER — BLOOD-GLUCOSE SENSOR
EACH MISCELLANEOUS
Qty: 3 | Refills: 9 | Status: ACTIVE | COMMUNITY
Start: 2020-09-17 | End: 1900-01-01

## 2020-09-17 RX ORDER — BLOOD-GLUCOSE,RECEIVER,CONT
EACH MISCELLANEOUS
Qty: 1 | Refills: 0 | Status: ACTIVE | COMMUNITY
Start: 2020-09-17 | End: 1900-01-01

## 2020-09-23 DIAGNOSIS — E78.5 HYPERLIPIDEMIA, UNSPECIFIED: ICD-10-CM

## 2020-09-23 DIAGNOSIS — E11.9 TYPE 2 DIABETES MELLITUS WITHOUT COMPLICATIONS: ICD-10-CM

## 2020-09-23 DIAGNOSIS — I10 ESSENTIAL (PRIMARY) HYPERTENSION: ICD-10-CM

## 2020-10-05 ENCOUNTER — APPOINTMENT (OUTPATIENT)
Dept: PODIATRY | Facility: HOSPITAL | Age: 75
End: 2020-10-05
Payer: MEDICAID

## 2020-10-05 ENCOUNTER — OUTPATIENT (OUTPATIENT)
Dept: OUTPATIENT SERVICES | Facility: HOSPITAL | Age: 75
LOS: 1 days | End: 2020-10-05
Payer: MEDICAID

## 2020-10-05 VITALS
SYSTOLIC BLOOD PRESSURE: 125 MMHG | RESPIRATION RATE: 14 BRPM | WEIGHT: 152 LBS | HEART RATE: 101 BPM | DIASTOLIC BLOOD PRESSURE: 80 MMHG | TEMPERATURE: 97.5 F | HEIGHT: 64 IN | BODY MASS INDEX: 25.95 KG/M2

## 2020-10-05 DIAGNOSIS — E11.9 TYPE 2 DIABETES MELLITUS WITHOUT COMPLICATIONS: ICD-10-CM

## 2020-10-05 DIAGNOSIS — M77.41 METATARSALGIA, RIGHT FOOT: ICD-10-CM

## 2020-10-05 DIAGNOSIS — M79.609 PAIN IN UNSPECIFIED LIMB: ICD-10-CM

## 2020-10-05 PROCEDURE — 99212 OFFICE O/P EST SF 10 MIN: CPT

## 2020-10-05 PROCEDURE — G0463: CPT

## 2020-10-05 NOTE — PHYSICAL EXAM
[2+] : left foot dorsalis pedis 2+ [Normal Foot/Ankle] : Both lower extremities were exposed and visualized. Standing exam demonstrates normal foot posture and alignment. Hindfoot exam shows no hindfoot valgus or varus [FreeTextEntry3] : mild ankle swelling b/l [de-identified] : rectus foot type b/l [FreeTextEntry1] : xerosis b/l of feet. RF plantar IPK at medial midfoot

## 2020-10-05 NOTE — ASSESSMENT
[FreeTextEntry1] : Pt is a 74yo type II diabetic who presents for diabetic foot exam\par -Pt was seen and examined\par -Pt educated on diabetic foot care including nightly checks, proper shoe gear\par -RF plantar callus debrided using 15 blade down to the level of the epidermis and not beyond w/o incident\par -Reappointed for 6 months\par

## 2020-10-05 NOTE — HISTORY OF PRESENT ILLNESS
[FreeTextEntry1] : Pt is a 76 yo type II diabetic who presents for diabetic foot exam at the discretion of her PCP. She has been diabetic for 20 years, reports her blood sugar this morning was around 120, her HbA1c was around 6. She denies any foot pain, but her daughter who translates for her, reports she has a small callus on the bottom of her right foot. The pt has no pain from this callus. Denies N/V/F/Sob

## 2020-10-13 NOTE — PHYSICAL EXAM
[de-identified] : \par 54-year-old female right hip pain for 7 months she's had extensive conservative management with physical therapy rest and anti-inflammatory medications. She is positive impingement signs and his labral testing. She also signs or muscle injury or tenderness the pubic tubercle tenderness along the abductor longus and pain with resisted setup. We'll obtain an MRI to further evaluate she'll follow up after MRI [Normal Appearance] : normal appearance [General Appearance - Well Developed] : well developed [General Appearance - In No Acute Distress] : no acute distress [Abdomen Soft] : soft [Abdomen Tenderness] : non-tender [Costovertebral Angle Tenderness] : no ~M costovertebral angle tenderness [Edema] : no peripheral edema [] : no respiratory distress [Oriented To Time, Place, And Person] : oriented to person, place, and time [Affect] : the affect was normal [No Focal Deficits] : no focal deficits [Motor Exam] : the motor exam was normal

## 2020-12-09 ENCOUNTER — RX RENEWAL (OUTPATIENT)
Age: 75
End: 2020-12-09

## 2020-12-09 RX ORDER — LOSARTAN POTASSIUM 25 MG/1
25 TABLET, FILM COATED ORAL DAILY
Qty: 30 | Refills: 5 | Status: ACTIVE | COMMUNITY
Start: 2020-06-18 | End: 1900-01-01

## 2020-12-12 NOTE — ED PROVIDER NOTE - CRITICAL CARE PROVIDED
Patient has no objection to blood transfusions. consult w/ pt's family directly relating to pts condition/direct patient care (not related to procedure)/additional history taking/interpretation of diagnostic studies/consultation with other physicians/documentation

## 2021-03-11 ENCOUNTER — APPOINTMENT (OUTPATIENT)
Dept: ENDOCRINOLOGY | Facility: HOSPITAL | Age: 76
End: 2021-03-11
Payer: MEDICAID

## 2021-03-11 ENCOUNTER — RESULT CHARGE (OUTPATIENT)
Age: 76
End: 2021-03-11

## 2021-03-11 ENCOUNTER — OUTPATIENT (OUTPATIENT)
Dept: OUTPATIENT SERVICES | Facility: HOSPITAL | Age: 76
LOS: 1 days | End: 2021-03-11
Payer: MEDICAID

## 2021-03-11 VITALS
SYSTOLIC BLOOD PRESSURE: 141 MMHG | HEIGHT: 57.5 IN | HEART RATE: 96 BPM | TEMPERATURE: 96 F | BODY MASS INDEX: 32.56 KG/M2 | DIASTOLIC BLOOD PRESSURE: 81 MMHG | WEIGHT: 153 LBS

## 2021-03-11 DIAGNOSIS — E11.65 TYPE 2 DIABETES MELLITUS WITH HYPERGLYCEMIA: ICD-10-CM

## 2021-03-11 LAB
GLUCOSE BLDC GLUCOMTR-MCNC: 115
GLUCOSE BLDC GLUCOMTR-MCNC: 115 MG/DL — HIGH (ref 70–99)

## 2021-03-11 PROCEDURE — 82043 UR ALBUMIN QUANTITATIVE: CPT

## 2021-03-11 PROCEDURE — G0463: CPT

## 2021-03-11 PROCEDURE — 99214 OFFICE O/P EST MOD 30 MIN: CPT | Mod: GC

## 2021-03-11 PROCEDURE — 82962 GLUCOSE BLOOD TEST: CPT

## 2021-03-11 RX ORDER — DULAGLUTIDE 0.75 MG/.5ML
0.75 INJECTION, SOLUTION SUBCUTANEOUS
Qty: 1 | Refills: 9 | Status: ACTIVE | COMMUNITY
Start: 2021-03-11 | End: 1900-01-01

## 2021-03-11 RX ORDER — BLOOD SUGAR DIAGNOSTIC
STRIP MISCELLANEOUS 4 TIMES DAILY
Qty: 1 | Refills: 11 | Status: ACTIVE | COMMUNITY
Start: 2020-02-20 | End: 1900-01-01

## 2021-03-11 RX ORDER — FLASH GLUCOSE SCANNING READER
EACH MISCELLANEOUS
Qty: 1 | Refills: 0 | Status: ACTIVE | COMMUNITY
Start: 2021-03-11 | End: 1900-01-01

## 2021-03-11 RX ORDER — FLASH GLUCOSE SENSOR
KIT MISCELLANEOUS
Qty: 2 | Refills: 11 | Status: ACTIVE | COMMUNITY
Start: 2021-03-11 | End: 1900-01-01

## 2021-03-12 LAB
CREAT ?TM UR-MCNC: 63 MG/DL — SIGNIFICANT CHANGE UP
MICROALBUMIN UR-MCNC: <1.2 MG/DL — SIGNIFICANT CHANGE UP
MICROALBUMIN/CREAT UR-RTO: SIGNIFICANT CHANGE UP MG/G (ref 0–30)

## 2021-03-18 ENCOUNTER — NON-APPOINTMENT (OUTPATIENT)
Age: 76
End: 2021-03-18

## 2021-03-18 PROBLEM — E11.65 TYPE 2 DIABETES MELLITUS WITH HYPERGLYCEMIA: Status: ACTIVE | Noted: 2021-03-18

## 2021-03-22 DIAGNOSIS — I10 ESSENTIAL (PRIMARY) HYPERTENSION: ICD-10-CM

## 2021-03-22 DIAGNOSIS — E78.5 HYPERLIPIDEMIA, UNSPECIFIED: ICD-10-CM

## 2021-03-26 ENCOUNTER — TRANSCRIPTION ENCOUNTER (OUTPATIENT)
Age: 76
End: 2021-03-26

## 2021-07-08 ENCOUNTER — OUTPATIENT (OUTPATIENT)
Dept: OUTPATIENT SERVICES | Facility: HOSPITAL | Age: 76
LOS: 1 days | End: 2021-07-08
Payer: MEDICAID

## 2021-07-08 ENCOUNTER — APPOINTMENT (OUTPATIENT)
Dept: ENDOCRINOLOGY | Facility: HOSPITAL | Age: 76
End: 2021-07-08

## 2021-07-08 DIAGNOSIS — E11.9 TYPE 2 DIABETES MELLITUS W/OUT COMPLICATIONS: ICD-10-CM

## 2021-07-08 DIAGNOSIS — E34.9 ENDOCRINE DISORDER, UNSPECIFIED: ICD-10-CM

## 2021-07-08 DIAGNOSIS — I10 ESSENTIAL (PRIMARY) HYPERTENSION: ICD-10-CM

## 2021-07-08 DIAGNOSIS — E78.5 HYPERLIPIDEMIA, UNSPECIFIED: ICD-10-CM

## 2021-07-08 PROCEDURE — G0463: CPT

## 2021-07-08 NOTE — REASON FOR VISIT
[Follow - Up] : a follow-up visit [DM Type 2] : DM Type 2 [Verbal consent obtained from patient] : the patient, [unfilled]

## 2021-07-12 NOTE — REVIEW OF SYSTEMS
[Cough] : cough [Back Pain] : back pain [As Noted in HPI] : as noted in HPI [Pain/Numbness of Digits] : pain/numbness of digits [All other systems negative] : All other systems negative [Fatigue] : no fatigue [Recent Weight Gain (___ Lbs)] : no recent weight gain [Recent Weight Loss (___ Lbs)] : no recent weight loss [Dry Eyes] : no dryness [Blurred Vision] : no blurred vision [Dysphagia] : no dysphagia [Neck Pain] : no neck pain [Dysphonia] : no dysphonia [Chest Pain] : no chest pain [Palpitations] : no palpitations [Nausea] : no nausea [Abdominal Pain] : no abdominal pain [Vomiting] : no vomiting [Polyuria] : no polyuria [Dysuria] : no dysuria [Dry Skin] : no dry skin [Hair Loss] : no hair loss [Dizziness] : no dizziness [Polydipsia] : no polydipsia [Cold Intolerance] : no cold intolerance [Heat Intolerance] : no heat intolerance [de-identified] : occasionally

## 2021-07-12 NOTE — HISTORY OF PRESENT ILLNESS
[FreeTextEntry1] : chief complaint: DM2\par \par 76 year old woman with T2DM (HbA1C 6.1% per son, checked by outside PMD), HTN, HLD, following up for T2DM. \par \par Patient speaks Tamil presents televisit. Translation provided by son in law.\par \par History of T2DM for 10 years. Her initial visit was in Feb 2020. Prior to that she was hospitalized for emphysematous pyelitis and her A1C at that time was noted to be 10.6%, was seen by zachary and DC on lantus 30 and humalog 16 TID in addition to her home medication metformin 1G BID.\par \par Last hgb a1c 6.1% in 6/25/2021 with PCP. Currently on Lantus 30 units qhs, metformin 1,000 mg BID, and trulicity 0.75 mg once weekly. Previously was on pre-meal humalog but stopped in March 2021 when Trulicity was started. Pt reports tolerating Trulicity well, denies side effects. Admits to slight decrease in appetite with trulicity. \par Checks BG 3-4 times a day with free style gladys:  \par Fasting: ~low-mid 100s\par Lunch: mid 100s\par Pre-dinner: avg 200\par Denies hypoglycemia. Lowest 80s, infrequently.\par Eat 3 meals a day\par Breakfast: whole wheat bread with peanut butter\par Lunch: Oat meal with vegetables \par Dinner: Roti (small 2-3) with vegetables\par Rice only 2-3 times a month. \par snack: between lunch and dinner ~ 5-6 pm, has some cookies \par \par Endorses occasional neuropathy. Last podiatry 10/2020. \par No known retinopathy, last optho visit 6/2021, follows annually. \par No known nephropathy, urine microalbumin/ Cr nl 3/2021. Denies hx of CVA or CAD. \par \par Measures BP also routinely and says usually ranges around 120s/80s. On Losartan 25 mg qday\par \par On atorvastatin 10 mg qd. LDL was 88 from 9/2020.

## 2021-07-12 NOTE — ASSESSMENT
[Diabetes Foot Care] : diabetes foot care [Long Term Vascular Complications] : long term vascular complications of diabetes [Carbohydrate Consistent Diet] : carbohydrate consistent diet [Importance of Diet and Exercise] : importance of diet and exercise to improve glycemic control, achieve weight loss and improve cardiovascular health [Hypoglycemia Management] : hypoglycemia management [Action and use of Insulin] : action and use of short and long-acting insulin [Self Monitoring of Blood Glucose] : self monitoring of blood glucose [Injection Technique, Storage, Sharps Disposal] : injection technique, storage, and sharps disposal [Sick-Day Management] : sick-day management [Retinopathy Screening] : Patient was referred to ophthalmology for retinopathy screening [FreeTextEntry1] : 76 year old woman with Type 2 DM, HTN, HLD\par \par 1. Type 2 DM- last A1C 6.1% in 6/2021 by outside PMD according to son in law \par Diabetes too tightly controlled for age\par No hypoglycemia\par - will decrease Lantus to 25 units qhs and up-titrate Trulicity to 1.5 mg qweekly\par - continue metformin 1000 mg BID\par - continue to monitor blood sugars 4 times a day with gladys and keep log \par - discussed with pt and son-in-law that goal glucose levels for pt is >100 and to call with lows\par - annual f/u with opthal (UTD, no retinopathy)\par - Urine microalbumin/ Cr negative 3/2021\par \par 2. HTN\par -continue losartan 25 mg qday\par \par 3. HLD\par c/w atorvastatin 10mg for now\par \par F/U visit in 3 months for in person visit\par \par D/W Dr. Ramos\par

## 2021-07-13 DIAGNOSIS — E11.9 TYPE 2 DIABETES MELLITUS WITHOUT COMPLICATIONS: ICD-10-CM

## 2021-07-13 DIAGNOSIS — I10 ESSENTIAL (PRIMARY) HYPERTENSION: ICD-10-CM

## 2021-07-13 DIAGNOSIS — E78.5 HYPERLIPIDEMIA, UNSPECIFIED: ICD-10-CM

## 2021-09-20 RX ORDER — DULAGLUTIDE 1.5 MG/.5ML
1.5 INJECTION, SOLUTION SUBCUTANEOUS
Qty: 3 | Refills: 1 | Status: ACTIVE | COMMUNITY
Start: 2021-07-08 | End: 1900-01-01

## 2021-10-07 ENCOUNTER — APPOINTMENT (OUTPATIENT)
Dept: ENDOCRINOLOGY | Facility: HOSPITAL | Age: 76
End: 2021-10-07

## 2021-10-14 ENCOUNTER — APPOINTMENT (OUTPATIENT)
Dept: ENDOCRINOLOGY | Facility: HOSPITAL | Age: 76
End: 2021-10-14

## 2023-11-10 NOTE — ED ADULT TRIAGE NOTE - CCCP TRG CHIEF CMPLNT
Has difficulty in her breathing today
I will call her
Pt called. States she has a dry cough, states mucous is not coming out. Denies fever. States feeling SOB on oxygen at 1 L. Spo2 is 98% on O2 1 L and Hr is 42. Pt denies anxiety. Currently taking prednisone 10 mg and is on day 2 of antibiotic (azithromycin). Please advice. THV offered for today. Pt states her dtr is not available and therefore unable to do THV. Caregiver nearby. Pls advise.
Spoke with pt; she has persistent np cough, needed oxygen this am. Using saline nebs TID, Duonebs q 4-6 and budesonide BID and mucinex. Also started using flutter valve. Currently taking prednisone 10mg and zpak. Recommended to continue nebs as above and use duonebs q4hrs with flutter valve to follow. Will increase prednisone to 40mg daily ; take 3 tabs today and start taper tomorrow. Pt aware if sx persist or worsen to call 911 or go to the ER. All questions answered.  Will call pt on Monday as scheduled at 9am
low blood pressure